# Patient Record
Sex: FEMALE | Race: WHITE | Employment: FULL TIME | URBAN - METROPOLITAN AREA
[De-identification: names, ages, dates, MRNs, and addresses within clinical notes are randomized per-mention and may not be internally consistent; named-entity substitution may affect disease eponyms.]

---

## 2019-11-22 ENCOUNTER — TRANSFERRED RECORDS (OUTPATIENT)
Dept: HEALTH INFORMATION MANAGEMENT | Facility: CLINIC | Age: 34
End: 2019-11-22

## 2020-10-26 ENCOUNTER — ALLIED HEALTH/NURSE VISIT (OUTPATIENT)
Dept: NURSING | Facility: CLINIC | Age: 35
End: 2020-10-26
Payer: COMMERCIAL

## 2020-10-26 DIAGNOSIS — Z23 NEED FOR PROPHYLACTIC VACCINATION AND INOCULATION AGAINST INFLUENZA: Primary | ICD-10-CM

## 2020-10-26 PROCEDURE — 90686 IIV4 VACC NO PRSV 0.5 ML IM: CPT

## 2020-10-26 PROCEDURE — 90471 IMMUNIZATION ADMIN: CPT

## 2021-07-07 ENCOUNTER — OFFICE VISIT (OUTPATIENT)
Dept: MIDWIFE SERVICES | Facility: CLINIC | Age: 36
End: 2021-07-07
Payer: COMMERCIAL

## 2021-07-07 VITALS
BODY MASS INDEX: 25.82 KG/M2 | SYSTOLIC BLOOD PRESSURE: 131 MMHG | DIASTOLIC BLOOD PRESSURE: 82 MMHG | OXYGEN SATURATION: 100 % | HEART RATE: 100 BPM | WEIGHT: 170.4 LBS | HEIGHT: 68 IN

## 2021-07-07 DIAGNOSIS — Z12.4 ROUTINE CERVICAL SMEAR: ICD-10-CM

## 2021-07-07 DIAGNOSIS — Z01.419 ENCOUNTER FOR GYNECOLOGICAL EXAMINATION WITHOUT ABNORMAL FINDING: Primary | ICD-10-CM

## 2021-07-07 DIAGNOSIS — Z13.228 SCREENING FOR METABOLIC DISORDER: ICD-10-CM

## 2021-07-07 DIAGNOSIS — Z13.21 SCREENING FOR MALNUTRITION: ICD-10-CM

## 2021-07-07 DIAGNOSIS — Z13.29 SCREENING FOR THYROID DISORDER: ICD-10-CM

## 2021-07-07 DIAGNOSIS — Z13.0 SCREENING FOR IRON DEFICIENCY ANEMIA: ICD-10-CM

## 2021-07-07 DIAGNOSIS — Z13.220 SCREENING FOR LIPOID DISORDERS: ICD-10-CM

## 2021-07-07 DIAGNOSIS — Z13.1 SCREENING FOR DIABETES MELLITUS: ICD-10-CM

## 2021-07-07 LAB
ERYTHROCYTE [DISTWIDTH] IN BLOOD BY AUTOMATED COUNT: 12.5 % (ref 10–15)
HBA1C MFR BLD: 5 % (ref 0–5.6)
HCT VFR BLD AUTO: 38.9 % (ref 35–47)
HGB BLD-MCNC: 13.2 G/DL (ref 11.7–15.7)
MCH RBC QN AUTO: 29.5 PG (ref 26.5–33)
MCHC RBC AUTO-ENTMCNC: 33.9 G/DL (ref 31.5–36.5)
MCV RBC AUTO: 87 FL (ref 78–100)
PLATELET # BLD AUTO: 205 10E9/L (ref 150–450)
RBC # BLD AUTO: 4.47 10E12/L (ref 3.8–5.2)
WBC # BLD AUTO: 5.4 10E9/L (ref 4–11)

## 2021-07-07 PROCEDURE — G0145 SCR C/V CYTO,THINLAYER,RESCR: HCPCS | Performed by: ADVANCED PRACTICE MIDWIFE

## 2021-07-07 PROCEDURE — 83036 HEMOGLOBIN GLYCOSYLATED A1C: CPT | Performed by: ADVANCED PRACTICE MIDWIFE

## 2021-07-07 PROCEDURE — 85027 COMPLETE CBC AUTOMATED: CPT | Performed by: ADVANCED PRACTICE MIDWIFE

## 2021-07-07 PROCEDURE — 99385 PREV VISIT NEW AGE 18-39: CPT | Performed by: ADVANCED PRACTICE MIDWIFE

## 2021-07-07 PROCEDURE — 87624 HPV HI-RISK TYP POOLED RSLT: CPT | Performed by: ADVANCED PRACTICE MIDWIFE

## 2021-07-07 PROCEDURE — 84443 ASSAY THYROID STIM HORMONE: CPT | Performed by: ADVANCED PRACTICE MIDWIFE

## 2021-07-07 PROCEDURE — 36415 COLL VENOUS BLD VENIPUNCTURE: CPT | Performed by: ADVANCED PRACTICE MIDWIFE

## 2021-07-07 PROCEDURE — 80053 COMPREHEN METABOLIC PANEL: CPT | Performed by: ADVANCED PRACTICE MIDWIFE

## 2021-07-07 PROCEDURE — 82306 VITAMIN D 25 HYDROXY: CPT | Performed by: ADVANCED PRACTICE MIDWIFE

## 2021-07-07 PROCEDURE — 80061 LIPID PANEL: CPT | Performed by: ADVANCED PRACTICE MIDWIFE

## 2021-07-07 SDOH — HEALTH STABILITY: MENTAL HEALTH: HOW OFTEN DO YOU HAVE A DRINK CONTAINING ALCOHOL?: MONTHLY OR LESS

## 2021-07-07 SDOH — HEALTH STABILITY: MENTAL HEALTH: HOW MANY STANDARD DRINKS CONTAINING ALCOHOL DO YOU HAVE ON A TYPICAL DAY?: 1 OR 2

## 2021-07-07 SDOH — HEALTH STABILITY: MENTAL HEALTH: HOW OFTEN DO YOU HAVE 6 OR MORE DRINKS ON ONE OCCASION?: NEVER

## 2021-07-07 ASSESSMENT — MIFFLIN-ST. JEOR: SCORE: 1506.43

## 2021-07-07 NOTE — PROGRESS NOTES
Masha is a 36 year old  female who presents for annual exam. She is feeling well. No concerns today but would like to mention a few things that are going on. She reports some changes to her menses which include getting migraines prior to her menses as well as breast tenderness. This was a change after her son was born and after breastfeeding which she stopped in 2017. We discussed these are normal changes to her menses. She is not ready to start any medication treatment for her migraines. She is due for a pap smear, collected today. She is due for annual labs, done today. She uses [facility] for birth control. She is  for 9 years. She is a . They just moved her from [facility], she is looking for a job to start this .     Menses are regular q 28-30 days and normal lasting 2 days spoting 5 days.  Menses flow: normal.  LMP 2021. Using [facility] for contraception.  She is not currently considering pregnancy.  Besides routine health maintenance,  she would like to discuss prolapse .  GYNECOLOGIC HISTORY:  Menarche: 9   Masha is sexually active with 1 male partner(s) and is currently in monogamous relationship with  x9 years.    History sexually transmitted infections: No STD history  STI testing offered?  Declined  MAGGY exposure: No  History of abnormal Pap smear: NO - age 30-65 PAP every 5 years with negative HPV co-testing recommended  Family history of breast CA: No  Family history of uterine/ovarian CA: No    Family history of colon CA: No    HEALTH MAINTENANCE:  Cholesterol: (No results found for: CHOL History of abnormal lipids: No  Mammo: na . History of abnormal Mammo: Not applicable.  Regular Self Breast Exams: No  Calcium/Vitamin D intake: source:  dairy, daily vit Adequate? Yes  TSH: (No results found for: TSH )  Pap; (No results found for: PAP )    HISTORY:  OB History    Para Term  AB Living   1 1 1 0 0 1   SAB TAB Ectopic Multiple Live Births   0  0 0 0 1      # Outcome Date GA Lbr Jonnie/2nd Weight Sex Delivery Anes PTL Lv   1 Term 01/12/15 41w0d  3.629 kg (8 lb) M Vag-Spont   CARLINE      Name: Olvin     Past Medical History:   Diagnosis Date     Gluten-sensitive enteropathy      Granuloma annulare      Intractable chronic migraine without aura      Rosacea      Past Surgical History:   Procedure Laterality Date     CYST REMOVAL      fatty tissue cyst, benign, on neck, left side     fibrous papule of nose      removed by derm     Family History   Problem Relation Age of Onset     Depression Mother      Migraines Mother      Other - See Comments Father         lump on breast     Migraines Maternal Grandmother      Liver Disease Maternal Grandmother         autoimmune liver disease     Depression Maternal Grandfather      Other - See Comments Paternal Grandmother         lump on breast     Seizure Disorder Son         from age 3-6 he took medication     Diabetes Maternal Uncle      Social History     Socioeconomic History     Marital status:      Spouse name: Not on file     Number of children: Not on file     Years of education: Not on file     Highest education level: Not on file   Occupational History     Not on file   Social Needs     Financial resource strain: Not on file     Food insecurity     Worry: Not on file     Inability: Not on file     Transportation needs     Medical: Not on file     Non-medical: Not on file   Tobacco Use     Smoking status: Not on file   Substance and Sexual Activity     Alcohol use: Not on file     Drug use: Not on file     Sexual activity: Not on file   Lifestyle     Physical activity     Days per week: Not on file     Minutes per session: Not on file     Stress: Not on file   Relationships     Social connections     Talks on phone: Not on file     Gets together: Not on file     Attends Sabianist service: Not on file     Active member of club or organization: Not on file     Attends meetings of clubs or organizations: Not on  "file     Relationship status: Not on file     Intimate partner violence     Fear of current or ex partner: Not on file     Emotionally abused: Not on file     Physically abused: Not on file     Forced sexual activity: Not on file   Other Topics Concern     Not on file   Social History Narrative     Not on file       Current Outpatient Medications:      Ascorbic Acid (VITAMIN C PO), , Disp: , Rfl:      Multiple Vitamin (DAILY VITAMINS PO), , Disp: , Rfl:      Allergies   Allergen Reactions     Sulfa Drugs      rashes       Past medical, surgical, social and family history were reviewed and updated in EPIC.    ROS:   C:     NEGATIVE for fever, chills, change in weight  I:       NEGATIVE for worrisome rashes, moles or lesions  E:     NEGATIVE for vision changes or irritation  E/M: NEGATIVE for ear, mouth and throat problems  R:     NEGATIVE for significant cough or SOB  CV:   NEGATIVE for chest pain, palpitations or peripheral edema  GI:     NEGATIVE for nausea, abdominal pain, heartburn, or change in bowel habits  :   NEGATIVE for frequency, dysuria, hematuria, vaginal discharge, or irregular bleeding  M:     NEGATIVE for significant arthralgias or myalgia  N:      NEGATIVE for weakness, dizziness or paresthesias  E:      NEGATIVE for temperature intolerance, skin/hair changes  P:      NEGATIVE for changes in mood or affect.    EXAM:  /82   Pulse 100   Ht 1.727 m (5' 8\")   Wt 77.3 kg (170 lb 6.4 oz)   SpO2 100%   BMI 25.91 kg/m     BMI: Body mass index is 25.91 kg/m .  Constitutional: healthy, alert and no distress  Head: Normocephalic. No masses, lesions, tenderness or abnormalities  Neck: Neck supple. Trachea midline. No adenopathy. Thyroid symmetric, normal size.   Cardiovascular: RRR.   Respiratory: Negative.   Breast: symmetrical and non tender, no masses, nipples everted, no discharge, SBE taught  Gastrointestinal: Abdomen soft, non-tender, non-distended. No masses, organomegaly.  :  Vulva:  No " external lesions, normal female hair distribution, no inguinal adenopathy.    Urethra:  Midline, non-tender, well supported, no discharge  Vagina:  Moist, pink, no abnormal discharge, no lesions  Uterus:  Normal size, retroverted, non-tender, freely mobile  Ovaries:  No masses appreciated, non-tender, mobile  Rectal Exam: deferred  Musculoskeletal: extremities normal  Skin: no suspicious lesions or rashes  Psychiatric: Affect appropriate, cooperative,mentation appears normal.     COUNSELING:   Reviewed preventive health counseling, as reflected in patient instructions   reports that she has never smoked. She has never used smokeless tobacco.    Body mass index is 25.91 kg/m .    FRAX Risk Assessment    ASSESSMENT:  36 year old female with satisfactory annual exam  (Z01.419) Encounter for gynecological examination without abnormal finding  (primary encounter diagnosis)    (Z12.4) Routine cervical smear  Plan: HPV High Risk Types DNA Cervical, Pap imaged         thin layer screen with HPV - recommended age 30        - 65 years (select HPV order below)    (Z13.21) Screening for malnutrition  Plan: Vitamin D Deficiency    (Z13.29) Screening for thyroid disorder  Plan: TSH with free T4 reflex    (Z13.220) Screening for lipoid disorders  Plan: Lipid panel reflex to direct LDL Non-fasting    (Z13.1) Screening for diabetes mellitus  Plan: Hemoglobin A1c    (Z13.0) Screening for iron deficiency anemia  Plan: CBC with platelets    (Z13.228) Screening for metabolic disorder  Plan: Comprehensive metabolic panel    Follow up for annual exams or PRN.   AAMIR Hargrove CNM

## 2021-07-08 LAB
ALBUMIN SERPL-MCNC: 4.3 G/DL (ref 3.4–5)
ALP SERPL-CCNC: 56 U/L (ref 40–150)
ALT SERPL W P-5'-P-CCNC: 29 U/L (ref 0–50)
ANION GAP SERPL CALCULATED.3IONS-SCNC: 7 MMOL/L (ref 3–14)
AST SERPL W P-5'-P-CCNC: 16 U/L (ref 0–45)
BILIRUB SERPL-MCNC: 0.4 MG/DL (ref 0.2–1.3)
BUN SERPL-MCNC: 12 MG/DL (ref 7–30)
CALCIUM SERPL-MCNC: 8.9 MG/DL (ref 8.5–10.1)
CHLORIDE SERPL-SCNC: 106 MMOL/L (ref 94–109)
CHOLEST SERPL-MCNC: 200 MG/DL
CO2 SERPL-SCNC: 27 MMOL/L (ref 20–32)
CREAT SERPL-MCNC: 0.77 MG/DL (ref 0.52–1.04)
DEPRECATED CALCIDIOL+CALCIFEROL SERPL-MC: 37 UG/L (ref 20–75)
GFR SERPL CREATININE-BSD FRML MDRD: >90 ML/MIN/{1.73_M2}
GLUCOSE SERPL-MCNC: 87 MG/DL (ref 70–99)
HDLC SERPL-MCNC: 69 MG/DL
LDLC SERPL CALC-MCNC: 116 MG/DL
NONHDLC SERPL-MCNC: 131 MG/DL
POTASSIUM SERPL-SCNC: 3.5 MMOL/L (ref 3.4–5.3)
PROT SERPL-MCNC: 6.9 G/DL (ref 6.8–8.8)
SODIUM SERPL-SCNC: 140 MMOL/L (ref 133–144)
TRIGL SERPL-MCNC: 76 MG/DL
TSH SERPL DL<=0.005 MIU/L-ACNC: 1.54 MU/L (ref 0.4–4)

## 2021-07-10 LAB
COPATH REPORT: NORMAL
PAP: NORMAL

## 2021-07-13 LAB
FINAL DIAGNOSIS: NORMAL
HPV HR 12 DNA CVX QL NAA+PROBE: NEGATIVE
HPV16 DNA SPEC QL NAA+PROBE: NEGATIVE
HPV18 DNA SPEC QL NAA+PROBE: NEGATIVE
SPECIMEN DESCRIPTION: NORMAL
SPECIMEN SOURCE CVX/VAG CYTO: NORMAL

## 2021-10-11 ENCOUNTER — HEALTH MAINTENANCE LETTER (OUTPATIENT)
Age: 36
End: 2021-10-11

## 2021-12-03 ENCOUNTER — MYC MEDICAL ADVICE (OUTPATIENT)
Dept: MIDWIFE SERVICES | Facility: CLINIC | Age: 36
End: 2021-12-03
Payer: COMMERCIAL

## 2021-12-03 DIAGNOSIS — M54.9 ACUTE MIDLINE BACK PAIN, UNSPECIFIED BACK LOCATION: Primary | ICD-10-CM

## 2021-12-03 NOTE — TELEPHONE ENCOUNTER
Patient notified via [facility] that PT referral placed. Given instructions and phone number to schedule.   Sylvia Bowers RN

## 2021-12-03 NOTE — TELEPHONE ENCOUNTER
Patient was seen for an AE back in July. She says she recently pulled a muscle in her back and it has been hurting her for a while. She is requesting a referral for PT. Are you okay with a referral or would you recommend the patient follow-up with FP? Ramya Anguiano RN

## 2021-12-03 NOTE — CONFIDENTIAL NOTE
I am fine placing the order. I signed the pending one. I added patient may be interested in pelvic floor and coordinating her appointments if available. Thanks, AAMIR Hargrove CNM

## 2021-12-31 NOTE — PROGRESS NOTES
[facility] Initial Evaluation    Subjective:  Masha Spence is a 36 year old female with a lumbar condition. Mechanism of injury: Unknown cause. Where: (home, work, MVA, community, recreation/sport, unknown, other): NA. Onset of symptoms: 11/10/21, PT order date: 12/3/21. Location of symptoms: bilateral low back, left lateral hip. Pain level on number scale: 3-10/10. Quality of pain: burning. Associated symptoms: stiffness, denies numbness and tingling. Pain frequency (constant/intermittent): intermittent. Symptoms are exacerbated by: sit<>stand, floor<>standing transfer, lifting, sitting, moving in bed, in<>out of bed. Symptoms are relieved by: stretching. Progression of symptoms since onset (same/better/worse): better. Special tests (x-ray, MRI, CT scan, EMG, bone scan): None. Previous treatment: massage. Improvement with previous treatment: moderate improvement. General health as reported by patient is good. Pertinent medical history includes:  see Epic. Medical allergies includes: see Epic. Surgical history includes: see Epic. Current medications include: see Epic. Occupation: . Patient is (working in normal job without restrictions/working in normal job with restrictions/working in an alternate job/not working due to present treatment problem): working in normal job. Primary job tasks: computer work, prolonged sitting, prolonged standing. Barriers at home/work: None reported by patient. Red flags: None reported by patient.    Answers for HPI/ROS submitted by the patient on 1/2/2022  Reason for Visit:: Back pain  When problem began:: 11/10/2021  How problem occurred:: First I tried doing yoga for the psoas, and then later that week I drove the car without remembering to adjust it after my , and then I did a lot of walking -- and then I woke up very stiff and in a lot of pain.  Number scale: 3/10  General health as reported by patient: good  Medical  allergies: none  Surgeries: none  Medications you are currently taking: none  Occupation::   What are your primary job tasks: computer work, prolonged sitting, prolonged standing    Objective  Posture    Sitting (good/fair/poor): poor  Standing (good/fair/poor):  fair  Lordosis (red/acc/normal):  Normal  Lateral shift (right/left/nil):  nil  Relevant lateral shift (yes/no):  No   Correction of posture (better/worse/no effect): better    Lumbar Movement Loss Lisandro Mod Min Nil Pain   Flexion    x    Extension  x   +   Side Gliding L   x  +   Side Gliding R    x +     Assessment/Plan:    Patient is a 36 year old female with lumbar complaints.    Patient has the following significant findings with corresponding treatment plan.                Diagnosis 1:  LBP  Pain -  manual therapy, self management, education, directional preference exercise and home program  Decreased ROM/flexibility - manual therapy and therapeutic exercise  Decreased joint mobility - manual therapy and therapeutic exercise  Decreased strength - therapeutic exercise and therapeutic activities  Impaired muscle performance - neuro re-education  Decreased function - therapeutic activities  Impaired posture - neuro re-education    Previous and current functional limitations:  (See Goal Flow Sheet for this information)    Short term and Long term goals: (See Goal Flow Sheet for this information)     Communication ability:  Patient appears to be able to clearly communicate and understand verbal and written communication and follow directions correctly.  Treatment Explanation - The following has been discussed with the patient:   RX ordered/plan of care  Anticipated outcomes  Possible risks and side effects  This patient would benefit from PT intervention to resume normal activities.   Rehab potential is good.    Frequency:  1 X week, once daily  Duration:  for 8 weeks  Discharge Plan:  Achieve all LTG.  Independent in home treatment  program.  Reach maximal therapeutic benefit.    Please refer to the daily flowsheet for treatment today, total treatment time and time spent performing 1:1 timed codes.

## 2022-01-03 ENCOUNTER — THERAPY VISIT (OUTPATIENT)
Dept: PHYSICAL THERAPY | Facility: CLINIC | Age: 37
End: 2022-01-03
Attending: ADVANCED PRACTICE MIDWIFE
Payer: COMMERCIAL

## 2022-01-03 DIAGNOSIS — M54.50 ACUTE BILATERAL LOW BACK PAIN WITHOUT SCIATICA: ICD-10-CM

## 2022-01-03 DIAGNOSIS — M54.9 ACUTE MIDLINE BACK PAIN, UNSPECIFIED BACK LOCATION: ICD-10-CM

## 2022-01-03 PROCEDURE — 97110 THERAPEUTIC EXERCISES: CPT | Mod: GP | Performed by: PHYSICAL THERAPIST

## 2022-01-03 PROCEDURE — 97161 PT EVAL LOW COMPLEX 20 MIN: CPT | Mod: GP | Performed by: PHYSICAL THERAPIST

## 2022-01-03 PROCEDURE — 97530 THERAPEUTIC ACTIVITIES: CPT | Mod: GP | Performed by: PHYSICAL THERAPIST

## 2022-01-10 ENCOUNTER — THERAPY VISIT (OUTPATIENT)
Dept: PHYSICAL THERAPY | Facility: CLINIC | Age: 37
End: 2022-01-10
Payer: COMMERCIAL

## 2022-01-10 DIAGNOSIS — M54.50 ACUTE BILATERAL LOW BACK PAIN WITHOUT SCIATICA: ICD-10-CM

## 2022-01-10 PROCEDURE — 97530 THERAPEUTIC ACTIVITIES: CPT | Mod: GP | Performed by: PHYSICAL THERAPIST

## 2022-01-10 PROCEDURE — 97110 THERAPEUTIC EXERCISES: CPT | Mod: GP | Performed by: PHYSICAL THERAPIST

## 2022-01-17 ENCOUNTER — THERAPY VISIT (OUTPATIENT)
Dept: PHYSICAL THERAPY | Facility: CLINIC | Age: 37
End: 2022-01-17
Payer: COMMERCIAL

## 2022-01-17 DIAGNOSIS — M54.50 ACUTE BILATERAL LOW BACK PAIN WITHOUT SCIATICA: ICD-10-CM

## 2022-01-17 PROCEDURE — 97110 THERAPEUTIC EXERCISES: CPT | Mod: GP | Performed by: PHYSICAL THERAPIST

## 2022-01-17 PROCEDURE — 97112 NEUROMUSCULAR REEDUCATION: CPT | Mod: GP | Performed by: PHYSICAL THERAPIST

## 2022-02-07 ENCOUNTER — THERAPY VISIT (OUTPATIENT)
Dept: PHYSICAL THERAPY | Facility: CLINIC | Age: 37
End: 2022-02-07
Payer: COMMERCIAL

## 2022-02-07 DIAGNOSIS — M54.50 ACUTE BILATERAL LOW BACK PAIN WITHOUT SCIATICA: ICD-10-CM

## 2022-02-07 PROCEDURE — 97112 NEUROMUSCULAR REEDUCATION: CPT | Mod: GP | Performed by: PHYSICAL THERAPIST

## 2022-02-07 PROCEDURE — 97110 THERAPEUTIC EXERCISES: CPT | Mod: GP | Performed by: PHYSICAL THERAPIST

## 2022-02-16 ENCOUNTER — THERAPY VISIT (OUTPATIENT)
Dept: PHYSICAL THERAPY | Facility: CLINIC | Age: 37
End: 2022-02-16
Payer: COMMERCIAL

## 2022-02-16 DIAGNOSIS — M54.50 ACUTE BILATERAL LOW BACK PAIN WITHOUT SCIATICA: ICD-10-CM

## 2022-02-16 PROCEDURE — 97110 THERAPEUTIC EXERCISES: CPT | Mod: GP | Performed by: PHYSICAL THERAPIST

## 2022-02-16 PROCEDURE — 97112 NEUROMUSCULAR REEDUCATION: CPT | Mod: GP | Performed by: PHYSICAL THERAPIST

## 2022-04-18 NOTE — PROGRESS NOTES
Discharge Note    Progress reporting period is from initial evaluation date (please see noted date below) to Feb 16, 2022.  Linked Episodes   Type: Episode: Status: Noted: Resolved: Last update: Updated by:   PHYSICAL THERAPY lumbar spine Active 1/3/2022  2/16/2022  4:05 PM Christian Rowland PT      Comments:       Masha failed to follow up and current status is unknown.  Please see information below for last relevant information on current status.  Patient seen for 5 visits.    SUBJECTIVE  Subjective changes noted by patient:  Patient reports pain symptoms are lot better. Decreased pain with prolonged sitting. Improving frequency of pressups throughout the day. Core strengthening exercises are going well.   .  Current pain level is 0/10.     Previous pain level was  10/10.   Changes in function:  Yes (See Goal flowsheet attached for changes in current functional level)  Adverse reaction to treatment or activity: None    OBJECTIVE  Changes noted in objective findings: lumbar AROM FL = WNL and no pain, EXT = min loss and no pain; front plank = 20 seconds     ASSESSMENT/PLAN  Diagnosis: LBP   Updated problem list and treatment plan:     STG/LTGs have been met or progress has been made towards goals:  Yes, please see goal flowsheet for most current information  Assessment of Progress: current status is unknown.    Last current status: Pt is progressing as expected   Self Management Plans:  HEP  I have re-evaluated this patient and find that the nature, scope, duration and intensity of the therapy is appropriate for the medical condition of the patient.  Masha continues to require the following intervention to meet STG and LTG's:  HEP.    Recommendations:  Discharge with current home program.  Patient to follow up with MD as needed.    Please refer to the daily flowsheet for treatment today, total treatment time and time spent performing 1:1 timed codes.

## 2022-06-10 ENCOUNTER — OFFICE VISIT (OUTPATIENT)
Dept: FAMILY MEDICINE | Facility: CLINIC | Age: 37
End: 2022-06-10
Payer: COMMERCIAL

## 2022-06-10 VITALS
WEIGHT: 174 LBS | TEMPERATURE: 97.5 F | SYSTOLIC BLOOD PRESSURE: 119 MMHG | RESPIRATION RATE: 16 BRPM | HEIGHT: 68 IN | OXYGEN SATURATION: 99 % | HEART RATE: 99 BPM | BODY MASS INDEX: 26.37 KG/M2 | DIASTOLIC BLOOD PRESSURE: 72 MMHG

## 2022-06-10 DIAGNOSIS — L98.9 SKIN LESION: ICD-10-CM

## 2022-06-10 DIAGNOSIS — Z00.00 ROUTINE HISTORY AND PHYSICAL EXAMINATION OF ADULT: Primary | ICD-10-CM

## 2022-06-10 DIAGNOSIS — Z11.4 SCREENING FOR HIV (HUMAN IMMUNODEFICIENCY VIRUS): ICD-10-CM

## 2022-06-10 DIAGNOSIS — M62.89 PELVIC FLOOR DYSFUNCTION IN FEMALE: ICD-10-CM

## 2022-06-10 DIAGNOSIS — Z83.49 FAMILY HISTORY OF THYROID DISEASE: ICD-10-CM

## 2022-06-10 DIAGNOSIS — Z13.220 SCREENING FOR LIPID DISORDERS: ICD-10-CM

## 2022-06-10 DIAGNOSIS — Z00.00 ROUTINE GENERAL MEDICAL EXAMINATION AT A HEALTH CARE FACILITY: ICD-10-CM

## 2022-06-10 DIAGNOSIS — Z13.21 ENCOUNTER FOR SCREENING FOR NUTRITIONAL DISORDER: ICD-10-CM

## 2022-06-10 DIAGNOSIS — Z11.59 NEED FOR HEPATITIS C SCREENING TEST: ICD-10-CM

## 2022-06-10 LAB
CHOLEST SERPL-MCNC: 175 MG/DL
DEPRECATED CALCIDIOL+CALCIFEROL SERPL-MC: 34 UG/L (ref 20–75)
FASTING STATUS PATIENT QL REPORTED: YES
HCV AB SERPL QL IA: NONREACTIVE
HDLC SERPL-MCNC: 70 MG/DL
HGB BLD-MCNC: 14.6 G/DL (ref 11.7–15.7)
HIV 1+2 AB+HIV1 P24 AG SERPL QL IA: NONREACTIVE
LDLC SERPL CALC-MCNC: 80 MG/DL
NONHDLC SERPL-MCNC: 105 MG/DL
TRIGL SERPL-MCNC: 123 MG/DL
TSH SERPL DL<=0.005 MIU/L-ACNC: 1.51 MU/L (ref 0.4–4)

## 2022-06-10 PROCEDURE — 82306 VITAMIN D 25 HYDROXY: CPT | Performed by: STUDENT IN AN ORGANIZED HEALTH CARE EDUCATION/TRAINING PROGRAM

## 2022-06-10 PROCEDURE — 87389 HIV-1 AG W/HIV-1&-2 AB AG IA: CPT | Performed by: STUDENT IN AN ORGANIZED HEALTH CARE EDUCATION/TRAINING PROGRAM

## 2022-06-10 PROCEDURE — 84443 ASSAY THYROID STIM HORMONE: CPT | Performed by: STUDENT IN AN ORGANIZED HEALTH CARE EDUCATION/TRAINING PROGRAM

## 2022-06-10 PROCEDURE — 99385 PREV VISIT NEW AGE 18-39: CPT | Performed by: STUDENT IN AN ORGANIZED HEALTH CARE EDUCATION/TRAINING PROGRAM

## 2022-06-10 PROCEDURE — 80061 LIPID PANEL: CPT | Performed by: STUDENT IN AN ORGANIZED HEALTH CARE EDUCATION/TRAINING PROGRAM

## 2022-06-10 PROCEDURE — 36415 COLL VENOUS BLD VENIPUNCTURE: CPT | Performed by: STUDENT IN AN ORGANIZED HEALTH CARE EDUCATION/TRAINING PROGRAM

## 2022-06-10 PROCEDURE — 85018 HEMOGLOBIN: CPT | Performed by: STUDENT IN AN ORGANIZED HEALTH CARE EDUCATION/TRAINING PROGRAM

## 2022-06-10 PROCEDURE — 86803 HEPATITIS C AB TEST: CPT | Performed by: STUDENT IN AN ORGANIZED HEALTH CARE EDUCATION/TRAINING PROGRAM

## 2022-06-10 ASSESSMENT — ENCOUNTER SYMPTOMS
FREQUENCY: 0
CONSTIPATION: 0
WEAKNESS: 0
ARTHRALGIAS: 0
JOINT SWELLING: 0
MYALGIAS: 0
BREAST MASS: 0
HEADACHES: 0
PALPITATIONS: 0
DYSURIA: 0
DIZZINESS: 0
HEARTBURN: 0
NERVOUS/ANXIOUS: 0
NAUSEA: 0
CHILLS: 0
EYE PAIN: 0
COUGH: 0
PARESTHESIAS: 0
ABDOMINAL PAIN: 0
SORE THROAT: 0
SHORTNESS OF BREATH: 0
DIARRHEA: 0
FEVER: 0
HEMATURIA: 0
HEMATOCHEZIA: 0

## 2022-06-10 NOTE — PROGRESS NOTES
SUBJECTIVE:   CC: Masha Spence is an 37 year old woman who presents for preventive health visit.     Patient has been advised of split billing requirements and indicates understanding: Yes  Healthy Habits:     Getting at least 3 servings of Calcium per day:  NO    Bi-annual eye exam:  Yes    Dental care twice a year:  Yes    Sleep apnea or symptoms of sleep apnea:  None and Daytime drowsiness    Diet:  Gluten-free/reduced    Frequency of exercise:  2-3 days/week    Duration of exercise:  30-45 minutes    Taking medications regularly:  Yes    PHQ-2 Total Score: 0    Additional concerns today:  No    PROBLEMS TO ADD ON...    Requests physical therapy for concern for vaginal prolapse. Notes pelvic floor straining when using the bathroom. Occasional constipation. Problematic since the birth of her son.     Jogs for exercise. Walks a lot.     Works as an  for elementary school. She has a 8 yo son.       Today's PHQ-2 Score:   PHQ-2 ( 1999 [employer]) 6/10/2022   Q1: Little interest or pleasure in doing things 0   Q2: Feeling down, depressed or hopeless 0   PHQ-2 Score 0   Q1: Little interest or pleasure in doing things Not at all   Q2: Feeling down, depressed or hopeless Not at all   PHQ-2 Score 0       Abuse: Current or Past (Physical, Sexual or Emotional) - No  Do you feel safe in your environment? Yes    Have you ever done Advance Care Planning? (For example, a Health Directive, POLST, or a discussion with a medical provider or your loved ones about your wishes): No, advance care planning information given to patient to review.  Patient plans to discuss their wishes with loved ones or provider.      Social History     Tobacco Use     Smoking status: Never Smoker     Smokeless tobacco: Never Used   Substance Use Topics     Alcohol use: Yes     If you drink alcohol do you typically have >3 drinks per day or >7 drinks per week? No    Alcohol Use 6/10/2022   Prescreen: >3 drinks/day or >7 drinks/week? No    Prescreen: >3 drinks/day or >7 drinks/week? -   No flowsheet data found.    Reviewed orders with patient.  Reviewed health maintenance and updated orders accordingly - Yes  Lab work is in process    Breast Cancer Screening:    FHS-7:   Breast CA Risk Assessment (FHS-7) 6/10/2022   Did any of your first-degree relatives have breast or ovarian cancer? Unknown   Did any of your relatives have bilateral breast cancer? No   Did any man in your family have breast cancer? Yes   Did any woman in your family have breast and ovarian cancer? Unknown   Did any woman in your family have breast cancer before age 50 y? Unknown   Do you have 2 or more relatives with breast and/or ovarian cancer? Unknown   Do you have 2 or more relatives with breast and/or bowel cancer? No   Father had a breast lump removed and his mother had a breast lump removed (unknown if cancer for either).     Pertinent mammograms are reviewed under the imaging tab.    History of abnormal Pap smear: NO - age 30-65 PAP every 5 years with negative HPV co-testing recommended  PAP / HPV Latest Ref Rng & Units 7/7/2021   PAP (Historical) - NIL   HPV16 NEG:Negative Negative   HPV18 NEG:Negative Negative   HRHPV NEG:Negative Negative     Reviewed and updated as needed this visit by clinical staff   Tobacco  Allergies  Meds   Med Hx  Surg Hx  Fam Hx  Soc Hx          Reviewed and updated as needed this visit by Provider                     Review of Systems   Constitutional: Negative for chills and fever.   HENT: Negative for congestion, ear pain, hearing loss and sore throat.    Eyes: Negative for pain and visual disturbance.   Respiratory: Negative for cough and shortness of breath.    Cardiovascular: Negative for chest pain, palpitations and peripheral edema.   Gastrointestinal: Negative for abdominal pain, constipation, diarrhea, heartburn, hematochezia and nausea.   Breasts:  Negative for tenderness, breast mass and discharge.   Genitourinary: Negative for  "dysuria, frequency, genital sores, hematuria, pelvic pain, urgency, vaginal bleeding and vaginal discharge.   Musculoskeletal: Negative for arthralgias, joint swelling and myalgias.   Skin: Negative for rash.   Neurological: Negative for dizziness, weakness, headaches and paresthesias.   Psychiatric/Behavioral: Negative for mood changes. The patient is not nervous/anxious.         OBJECTIVE:   /72 (BP Location: Right arm, Patient Position: Chair, Cuff Size: Adult Regular)   Pulse 99   Temp 97.5  F (36.4  C) (Temporal)   Resp 16   Ht 1.734 m (5' 8.25\")   Wt 78.9 kg (174 lb)   LMP 05/29/2022 (Exact Date)   SpO2 99%   BMI 26.26 kg/m    Physical Exam  GENERAL: healthy, alert and no distress  EYES: Eyes grossly normal to inspection, PERRL and conjunctivae and sclerae normal  HENT: ear canals and TM's normal, nose and mouth without ulcers or lesions  NECK: no adenopathy, no asymmetry, masses, or scars and thyroid normal to palpation  RESP: lungs clear to auscultation - no rales, rhonchi or wheezes  BREAST: normal without masses, tenderness or nipple discharge and no palpable axillary masses or adenopathy  CV: regular rate and rhythm, normal S1 S2, no S3 or S4, no murmur, click or rub, no peripheral edema and peripheral pulses strong  ABDOMEN: soft, nontender, no hepatosplenomegaly, no masses and bowel sounds normal  MS: no gross musculoskeletal defects noted, no edema  SKIN: no suspicious lesions or rashes  NEURO: Normal strength and tone, mentation intact and speech normal  PSYCH: mentation appears normal, affect normal/bright    Diagnostic Test Results:  Labs reviewed in Epic    ASSESSMENT/PLAN:   Masha was seen today for physical and establish care.    Diagnoses and all orders for this visit:    Routine history and physical examination of adult  -     Hemoglobin; Future  -     Hemoglobin    Screening for HIV (human immunodeficiency virus)  -     HIV Antigen Antibody Combo; Future  -     HIV Antigen " "Antibody Combo    Need for hepatitis C screening test  -     Hepatitis C Screen Reflex to HCV RNA Quant and Genotype; Future  -     Hepatitis C Screen Reflex to HCV RNA Quant and Genotype    Pelvic floor dysfunction in female  -     Physical Therapy Referral; Future    Skin lesion  Dermatology referral requested for skin check.  -     Adult Dermatology Referral; Future    Encounter for screening for nutritional disorder  -     Vitamin D Deficiency; Future  -     Vitamin D Deficiency    Screening for lipid disorders  -     Lipid panel reflex to direct LDL Non-fasting; Future  -     Lipid panel reflex to direct LDL Non-fasting    Family history of thyroid disease  -     TSH with free T4 reflex; Future  -     TSH with free T4 reflex    Other orders  -     REVIEW OF HEALTH MAINTENANCE PROTOCOL ORDERS      COUNSELING:  Reviewed preventive health counseling, as reflected in patient instructions    Estimated body mass index is 26.26 kg/m  as calculated from the following:    Height as of this encounter: 1.734 m (5' 8.25\").    Weight as of this encounter: 78.9 kg (174 lb).        She reports that she has never smoked. She has never used smokeless tobacco.      Counseling Resources:  ATP IV Guidelines  Pooled Cohorts Equation Calculator  Breast Cancer Risk Calculator  BRCA-Related Cancer Risk Assessment: FHS-7 Tool  FRAX Risk Assessment  ICSI Preventive Guidelines  Dietary Guidelines for Americans, 2010  USDA's MyPlate  ASA Prophylaxis  Lung CA Screening    Ciarra Vasquez MD  [facility]  "

## 2022-06-10 NOTE — PATIENT INSTRUCTIONS
For constipation, magnesium citrate can be helpful 400 mg per day.     Preventive Health Recommendations  Female Ages 26 - 39  Yearly exam:   See your health care provider every year in order to    Review health changes.     Discuss preventive care.      Review your medicines if you your doctor has prescribed any.    Until age 30: Get a Pap test every three years (more often if you have had an abnormal result).    After age 30: Talk to your doctor about whether you should have a Pap test every 3 years or have a Pap test with HPV screening every 5 years.   You do not need a Pap test if your uterus was removed (hysterectomy) and you have not had cancer.  You should be tested each year for STDs (sexually transmitted diseases), if you're at risk.   Talk to your provider about how often to have your cholesterol checked.  If you are at risk for diabetes, you should have a diabetes test (fasting glucose).  Shots: Get a flu shot each year. Get a tetanus shot every 10 years.   Nutrition:     Eat at least 5 servings of fruits and vegetables each day.    Eat whole-grain bread, whole-wheat pasta and brown rice instead of white grains and rice.    Get adequate Calcium and Vitamin D.     Lifestyle    Exercise at least 150 minutes a week (30 minutes a day, 5 days of the week). This will help you control your weight and prevent disease.    Limit alcohol to one drink per day.    No smoking.     Wear sunscreen to prevent skin cancer.    See your dentist every six months for an exam and cleaning.

## 2022-06-29 ENCOUNTER — TELEPHONE (OUTPATIENT)
Dept: FAMILY MEDICINE | Facility: CLINIC | Age: 37
End: 2022-06-29

## 2022-06-29 ENCOUNTER — LAB (OUTPATIENT)
Dept: LAB | Facility: CLINIC | Age: 37
End: 2022-06-29
Attending: FAMILY MEDICINE
Payer: COMMERCIAL

## 2022-06-29 DIAGNOSIS — Z20.822 ENCOUNTER FOR LABORATORY TESTING FOR COVID-19 VIRUS: ICD-10-CM

## 2022-06-29 PROCEDURE — U0005 INFEC AGEN DETEC AMPLI PROBE: HCPCS

## 2022-06-29 PROCEDURE — U0003 INFECTIOUS AGENT DETECTION BY NUCLEIC ACID (DNA OR RNA); SEVERE ACUTE RESPIRATORY SYNDROME CORONAVIRUS 2 (SARS-COV-2) (CORONAVIRUS DISEASE [COVID-19]), AMPLIFIED PROBE TECHNIQUE, MAKING USE OF HIGH THROUGHPUT TECHNOLOGIES AS DESCRIBED BY CMS-2020-01-R: HCPCS

## 2022-06-29 NOTE — TELEPHONE ENCOUNTER
Reason for Call:  Form, our goal is to have forms completed with 72 hours, however, some forms may require a visit or additional information.    Type of letter, form or note:  Certificate of Testig for covid 19 form from [facility].  Patient and son came in for a lab only appointment today to get tested for covid.  They need the form signed by a provider once the results are back.  Patient is wanting to  the forms on Friday the latest.  Who is able to sign and stamp the forms?    Who is the form from?: [facility], [employer], [facility] (if other please explain)    Where did the form come from: Patient or family brought in       What clinic location was the form placed at?: [place]    Where the form was placed: I will place on desk of provider that is able to sign and stamp the forms    What number is listed as a contact on the form?: NO phone number listed       Additional comments: Also have form for son.    Call taken on 6/29/2022 at 4:41 PM by Shruti Chen

## 2022-06-30 LAB — SARS-COV-2 RNA RESP QL NAA+PROBE: NEGATIVE

## 2022-06-30 NOTE — TELEPHONE ENCOUNTER
If patient's do not have PCP here, should we be giving forms back and asking them to forward to their provider? Thanks!

## 2022-06-30 NOTE — TELEPHONE ENCOUNTER
Masha was insistent that a provider at [place] review the forms for signature. Author will discuss with providers when they are back in medical clinic.

## 2022-08-09 ENCOUNTER — THERAPY VISIT (OUTPATIENT)
Dept: PHYSICAL THERAPY | Facility: CLINIC | Age: 37
End: 2022-08-09
Attending: STUDENT IN AN ORGANIZED HEALTH CARE EDUCATION/TRAINING PROGRAM
Payer: COMMERCIAL

## 2022-08-09 DIAGNOSIS — M62.89 PELVIC FLOOR DYSFUNCTION IN FEMALE: ICD-10-CM

## 2022-08-09 PROCEDURE — 97530 THERAPEUTIC ACTIVITIES: CPT | Mod: GP | Performed by: PHYSICAL THERAPIST

## 2022-08-09 PROCEDURE — 97140 MANUAL THERAPY 1/> REGIONS: CPT | Mod: GP | Performed by: PHYSICAL THERAPIST

## 2022-08-09 PROCEDURE — 97110 THERAPEUTIC EXERCISES: CPT | Mod: GP | Performed by: PHYSICAL THERAPIST

## 2022-08-09 PROCEDURE — 97161 PT EVAL LOW COMPLEX 20 MIN: CPT | Mod: GP | Performed by: PHYSICAL THERAPIST

## 2022-08-09 NOTE — PROGRESS NOTES
"Physical Therapy Initial Evaluation  Subjective:  The history is provided by the patient. No  was used.   Patient Health History  Masha Spence being seen for suspected prolapse.     Date of Onset: atleast the past year.   Problem occurred: possible started from exercise, or delayed toilet time    Pain is reported as 0/10 on pain scale.  General health as reported by patient is good.        Other medical allergies details: sulfa drugs .    Other surgery history details: benign tumor removal .     Other medications details: daily vitamin .    Current occupation is Teacher .   Primary job tasks include:  Computer work, driving, prolonged sitting and prolonged standing.                                  History of current episode:    Onset date/MD order: Pelvic floor dysfunction 6/10/2022.    CC/Present symptoms: Patient reports that she has a feeling of something falling out of the vagina, just when she is on the toilet.  Started to feel this atleast a year ago.  She feels it most when she is having bowel movement, feels like she can't get all of the stool out.  She does sometimes splint and that does seem to help evacuate the rest of the stool.  Feels like bowel movements are consistent otherwise.  Urination seems to be fine, had pelvic floor physical therapy about 5 years ago for urinary hesitancy and it seems to be doing better since that.  She thinks she grew up pushing pee out and has tried to not do that since her last episode of PT.   Denies pressure or heaviness any other time.  Had some low back pain starting last fall with new job and longer commute, improved with PT and seems to be doing better.  Did do some \"mama strong\" exercise classes and is wondering if that made it worse, did have some leakage once in that class.  Also has used menstrual cup in the past, but stopped using it when she noticed this.    HPI/SMHx/Childbirth hx::.  6 years old.  Pregnancy was fine, vaginal " delivery with minor tearing no stitches.  Had some feeling of bladder infections for 2 years following birth, got better with PT.    Pain rating (0-10): 0/10.    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  Usually 1-2  Miles a couple times per week.    Goals: Find out if there is a way to avoid more prolapse   Red flags:    Urination:  Do you leak on the way to the bathroom or with a strong urge to void? no   Do you leak with cough,sneeze, jumping, running? no  Any other activities that cause leaking?  Only a handful of times from bodyweight exercises, not a big concern   Do you have triggers that make you feel you can't wait to go to the bathroom?  What are they? no.  Type of pad and number used per day? none  When you leak what is the amount? n/a  How long can you delay the need to urinate? A few hours.   Do you feel excessive pressure in pelvic floor:yes.  When? On toilet, sometimes with exercise  Frequency of daytime urination:every few hours  Frequency of nighttime urination:1  Can you stop the flow of urine when on the toilet? yes  Is the volume of urine passed usually:  (8sec rule= 250ml with average bladder storing 400-600ml) medium  Do you strain to pass urine? no  Do you have a slow or hesitant urinary stream? no  Do you have difficulty initiating the urine stream? no  Is urination painful? no  How many bladder infections have you had in last 12 months?0  Fluid intake(one glass is 8oz or one cup) 2-3glasses/day, -caffinated glasses/day  - alcohol glasses/day.  Bowel habits:  Frequency of bowel movements? 1 times a day  Consistancy of stool?  Martin Stool Scale type 4  Do you ignore the urge to defecate? no  Do you strain to pass stool? Sometimes, it will feel like I need to strain and then very little comes out    Pelvic Pain:  Do you have any pelvic pain with intercourse, exams, use of tampons? no  Is initial penetration during intercourse painful? no  Is deeper penetration painful? no  Do you use lubricant?  Yes, or salive  Are you sexually active? yes  Have you ever been worried for your physical safety? no  Have you practiced the PF(kegel) exercises for 4 or more weeks?no  Marinoff Scale:Level n/a  (Level 3: Abstinence from intercourse because of severe pain. Level 2: Painful intercourse which limites frequency of activity. Level 1: Painful intercourse not severe enough to prevent activity.)    OBJECTIVE:      Treatment/Education provided this session: please see flow sheet for details    ASSESSMENT/PLAN:    Patient is a 37 year old female with pelvic floor  complaints.    Patient has the following significant findings with corresponding treatment plan.                Diagnosis 1:  Pelvic floor dysfunction -  manual therapy, self management, education, directional preference exercise and home program  Decreased ROM/flexibility - manual therapy and therapeutic exercise  Decreased joint mobility - manual therapy and therapeutic exercise  Decreased strength - therapeutic exercise and therapeutic activities  Decreased proprioception - neuro re-education and therapeutic activities  Impaired gait - gait training  Impaired muscle performance - neuro re-education  Decreased function - therapeutic activities  Impaired posture - neuro re-education    Previous and current functional limitations:  (See Goal Flow Sheet for this information)    Short term and Long term goals: (See Goal Flow Sheet for this information)     Communication ability:  Patient appears to be able to clearly communicate and understand verbal and written communication and follow directions correctly.  Treatment Explanation - The following has been discussed with the patient:   RX ordered/plan of care  Anticipated outcomes  Possible risks and side effects  This  patient would benefit from PT intervention to resume normal activities.   Rehab potential is good.    Frequency:  1X week, once daily, every 3 weeks   Duration:  for 12 weeks  Discharge Plan:  Achieve all LTG.  Independent in home treatment program.  Reach maximal therapeutic benefit.    Please refer to the daily flowsheet for treatment today, total treatment time and time spent performing 1:1 timed codes.     Objective:  System         Lumbar/SI Evaluation  ROM:    AROM Lumbar:   Flexion:          Min nickerson   Ext:                    Min nickerson    Side Bend:        Left:     Right:   Rotation:           Left:     Right:   Side Glide:        Left:  Wnl    Right:  Wnl                                                    Pelvic Dysfunction Evaluation:        Flexibility:    Tightness present at:Hamstrings and Piriformis    Abdominal Wall:  Abdominal wall pelvic: abdominal distension with abdominal contraction.  Diastasis Recti:  Normal      Pelvic Clock Exam:  Pelvic clock exam: TTP to DTP, LA with internal palpation.  Ischiocavernosis pain:  -  Bulbocavernosis pain:  -  Transverse Perineal:  +  Levator ANI:  ++        External Assessment:  External assessment pelvic: Grade 2 cystocele, uterine prolapse, and rectocele, difficult to assess due to difficulty with bearing down.  Skin Condition:  Normal  Scars:  Well healed  Bearing Down/Coughing:  Uterine prolapse, rectocele and cystocele      Muscle Contraction/Perineal Mobility:  Slight lift, no urogential triangle descent  Internal Assessment:  Internal assessment pelvic: some pelvic descent with abdominal contraction indicating difficulty with pressure management.  Sensory Exam:  Normal  Contraction/Grade:  Weak squeeze, 2 second hold (2)                    Hip Evaluation  HIP AROM:  AROM:    Left Hip:     Normal    Right Hip:   Normal                          Functional Testing:          Quad:    Single leg squat:   Left:    Mild loss of control  Right:   Mild loss of  control    Bilateral leg squat:  Normal control     Proprioception:    Stork balance test:   Left:    15  Right:  15  % of Uninvolved:                General     ROS

## 2022-09-22 ENCOUNTER — PRENATAL OFFICE VISIT (OUTPATIENT)
Dept: NURSING | Facility: CLINIC | Age: 37
End: 2022-09-22
Payer: COMMERCIAL

## 2022-09-22 ENCOUNTER — TRANSCRIBE ORDERS (OUTPATIENT)
Dept: MATERNAL FETAL MEDICINE | Facility: CLINIC | Age: 37
End: 2022-09-22

## 2022-09-22 VITALS — HEIGHT: 68 IN | BODY MASS INDEX: 26.52 KG/M2 | WEIGHT: 175 LBS

## 2022-09-22 DIAGNOSIS — O26.90 PREGNANCY RELATED CONDITION, ANTEPARTUM: Primary | ICD-10-CM

## 2022-09-22 DIAGNOSIS — O09.529 ENCOUNTER FOR SUPERVISION OF MULTIGRAVIDA WITH ADVANCED MATERNAL AGE: Primary | ICD-10-CM

## 2022-09-22 DIAGNOSIS — Z23 NEED FOR TDAP VACCINATION: ICD-10-CM

## 2022-09-22 PROCEDURE — 99207 PR NO CHARGE NURSE ONLY: CPT

## 2022-09-22 NOTE — PROGRESS NOTES
Important Information for Provider:     New ob nurse intake by phone, second pregnancy, AMA . Patient had pregnancy in 2015 with no complications. Handouts reviewed and mailed. Ordered genetic screening. Recommended B6, Unisom for nausea.   Patient had Covid 8/11/2022, concerns bout early pregnancy. Ultrasound scheduled for 10/03/2022 with CNM to call with results. NOB with CNM 10/20/2022    Caffeine intake/servings daily - 1 tea/coffee  Calcium intake/servings daily - 3  Exercise 5 times weekly - describe ; walks, runs, precautions given  Sunscreen used - Yes  Seatbelts used - Yes  Guns stored in the home - No  Self Breast Exam - Yes  Pap test up to date -  Yes  Eye exam up to date -  Yes  Dental exam up to date -  Yes  Immunizations reviewed and up to date - Yes  Abuse: Current or Past (Physical, Sexual or Emotional) - No  Do you feel safe in your environment - Yes  Do you cope well with stress - Yes      Prenatal OB Questionnaire  Patient supplied answers from flow sheet for:  Prenatal OB Questionnaire.  Past Medical History  Have you ever recieved care for your mental health? : No  Have you ever been in a major accident or suffered serious trauma?: No  Within the last year, has anyone hit, slapped, kicked or otherwise hurt you?: No  In the last year, has anyone forced you to have sex when you didn't want to?: No    Past Medical History 2   Have you ever received a blood transfusion?: No  Would you accept a blood transfusion if was medically recommended?: Yes  Does anyone in your home smoke?: No   Is your blood type Rh negative?: No  Have you ever ?: No  Have you been hospitalized for a nonsurgical reason excluding normal delivery?: No  Have you ever had an abnormal pap smear?: No    Past Medical History (Continued)  Do you have a history of abnormalities of the uterus?: No  Did your mother take MAGGY or any other hormones when she was pregnant with you?: No  Do you have any other problems we have not  asked about which you feel may be important to this pregnancy?: No                     Allergies as of 9/22/2022:    Allergies as of 09/22/2022 - Reviewed 09/22/2022   Allergen Reaction Noted     Sulfa drugs  07/07/2021       Current medications are:  Current Outpatient Medications   Medication Sig Dispense Refill     Ascorbic Acid (VITAMIN C PO)            Early ultrasound screening tool:    Does patient have irregular periods?  No  Did patient use hormonal birth control in the three months prior to positive urine pregnancy test? No  Is the patient breastfeeding?  No  Is the patient 10 weeks or greater at time of education visit?  No

## 2022-09-24 ENCOUNTER — HEALTH MAINTENANCE LETTER (OUTPATIENT)
Age: 37
End: 2022-09-24

## 2022-09-26 ENCOUNTER — THERAPY VISIT (OUTPATIENT)
Dept: PHYSICAL THERAPY | Facility: CLINIC | Age: 37
End: 2022-09-26
Payer: COMMERCIAL

## 2022-09-26 DIAGNOSIS — M54.50 ACUTE BILATERAL LOW BACK PAIN WITHOUT SCIATICA: ICD-10-CM

## 2022-09-26 DIAGNOSIS — M62.89 PELVIC FLOOR DYSFUNCTION IN FEMALE: Primary | ICD-10-CM

## 2022-09-26 DIAGNOSIS — M54.9 ACUTE MIDLINE BACK PAIN, UNSPECIFIED BACK LOCATION: ICD-10-CM

## 2022-09-26 PROCEDURE — 97140 MANUAL THERAPY 1/> REGIONS: CPT | Mod: GP | Performed by: PHYSICAL THERAPIST

## 2022-09-26 PROCEDURE — 97110 THERAPEUTIC EXERCISES: CPT | Mod: GP | Performed by: PHYSICAL THERAPIST

## 2022-10-03 ENCOUNTER — VIRTUAL VISIT (OUTPATIENT)
Dept: MIDWIFE SERVICES | Facility: CLINIC | Age: 37
End: 2022-10-03
Payer: COMMERCIAL

## 2022-10-03 ENCOUNTER — ANCILLARY PROCEDURE (OUTPATIENT)
Dept: ULTRASOUND IMAGING | Facility: CLINIC | Age: 37
End: 2022-10-03
Attending: ADVANCED PRACTICE MIDWIFE
Payer: COMMERCIAL

## 2022-10-03 DIAGNOSIS — Z34.90 INTRAUTERINE PREGNANCY: Primary | ICD-10-CM

## 2022-10-03 DIAGNOSIS — O09.529 ENCOUNTER FOR SUPERVISION OF MULTIGRAVIDA WITH ADVANCED MATERNAL AGE: ICD-10-CM

## 2022-10-03 PROCEDURE — 99207 PR NO CHARGE LOS: CPT | Performed by: ADVANCED PRACTICE MIDWIFE

## 2022-10-03 PROCEDURE — 76801 OB US < 14 WKS SINGLE FETUS: CPT | Performed by: OBSTETRICS & GYNECOLOGY

## 2022-10-03 NOTE — PROGRESS NOTES
Masha is a 37 year old who is being evaluated via a billable telephone visit.      What phone number would you like to be contacted at? [phone]    How would you like to obtain your AVS? Saima FAN:  Masha is seen for telephone visit to review dating and viability US. She has known LMP of 7/27/22 and known date of last intercourse as 8/6/22. LMP aligns with dating of 9w5d with KWAKU of 5/3/23. Date of intercourse/conception aligns with dating of 10w2d with KWAKU of 4/29/23.    O:  US shows woods viable IUP dated 11w0d with KWAKU 4/24/23,     A/P:  (Z34.90) Intrauterine pregnancy  (primary encounter diagnosis)  Comment: Discussed option for additional US to assess dating in 2 weeks. Known date of conception aligns with US today but LMP does not. Plan to use known date of conception for dating and adjust as needed with additional US. RTC for NOB.  Plan: US OB < 14 Weeks Single Transabdominal    AAMIR Horton CNM            Phone call duration: 7 minutes

## 2022-10-10 ENCOUNTER — THERAPY VISIT (OUTPATIENT)
Dept: PHYSICAL THERAPY | Facility: CLINIC | Age: 37
End: 2022-10-10
Payer: COMMERCIAL

## 2022-10-10 DIAGNOSIS — M62.89 PELVIC FLOOR DYSFUNCTION IN FEMALE: Primary | ICD-10-CM

## 2022-10-10 DIAGNOSIS — M54.9 ACUTE MIDLINE BACK PAIN, UNSPECIFIED BACK LOCATION: ICD-10-CM

## 2022-10-10 PROCEDURE — 97112 NEUROMUSCULAR REEDUCATION: CPT | Mod: GP | Performed by: PHYSICAL THERAPIST

## 2022-10-10 PROCEDURE — 97110 THERAPEUTIC EXERCISES: CPT | Mod: GP | Performed by: PHYSICAL THERAPIST

## 2022-10-14 ENCOUNTER — PRENATAL OFFICE VISIT (OUTPATIENT)
Dept: MIDWIFE SERVICES | Facility: CLINIC | Age: 37
End: 2022-10-14
Payer: COMMERCIAL

## 2022-10-14 ENCOUNTER — ANCILLARY PROCEDURE (OUTPATIENT)
Dept: ULTRASOUND IMAGING | Facility: CLINIC | Age: 37
End: 2022-10-14
Attending: ADVANCED PRACTICE MIDWIFE
Payer: COMMERCIAL

## 2022-10-14 VITALS — SYSTOLIC BLOOD PRESSURE: 125 MMHG | DIASTOLIC BLOOD PRESSURE: 81 MMHG | BODY MASS INDEX: 26.46 KG/M2 | WEIGHT: 174 LBS

## 2022-10-14 DIAGNOSIS — Z34.90 INTRAUTERINE PREGNANCY: ICD-10-CM

## 2022-10-14 DIAGNOSIS — O09.521 MULTIGRAVIDA OF ADVANCED MATERNAL AGE IN FIRST TRIMESTER: Primary | ICD-10-CM

## 2022-10-14 PROBLEM — O09.529 AMA (ADVANCED MATERNAL AGE) MULTIGRAVIDA 35+: Status: ACTIVE | Noted: 2022-10-14

## 2022-10-14 PROCEDURE — 99207 PR FIRST OB VISIT: CPT | Performed by: ADVANCED PRACTICE MIDWIFE

## 2022-10-14 PROCEDURE — 76801 OB US < 14 WKS SINGLE FETUS: CPT | Performed by: OBSTETRICS & GYNECOLOGY

## 2022-10-14 NOTE — PROGRESS NOTES
11w6d   Masha Spence is a 37 year old who presents to the clinic for an new ob visit. She is not a previous CNM patient for pregnancy but sees us for annual exams. They are happy about this pregnancy. Virgilio is here today. Olvin is excited as well. She is starting to feel better. Has genetic testing scheduled. Has flu vaccination. Had covid early pregnancy, discussed growth. Planning covid booster at some point but wants to wait today. No other questions or concerns today.       Estimated Date of Delivery: Apr 29, 2023 is calculated from Patient's last menstrual period was 07/27/2022.     She has not had bleeding since her LMP.   She has had mild nausea. Weigh loss has not occurred.  FOB is involved, Virgilio   OTHER CONCERNS: None    INFECTION HISTORY  HIV: no  Hepatitis B: no  Hepatitis C: no  Syphilis:  no  Tuberculosis: no   PPD- no  Herpes self: no  Herpes partner:  no  Chlamydia:  no  Gonorrhea:  no  HPV: no  BV:  no  Trichomonis:  no  Chicken Pox:  YES  ====================================================  GENETIC SCREENING  Genetic screening reviewed. High Risk? Paternal great uncle with albinism, maternal side with autoimmune diseases, and paternal side with cancers, son with epilepsy, outgrown now and no diagnosis was found.    ====================================================  PERSONAL/SOCIAL HISTORY  Lives lives with their family.  Employment: Full time as .  Her job involves moderate activity. 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   HX OF ABUSE: no  =====================================================   REVIEW OF SYSTEMS  CONSTITUTIONAL: NEGATIVE for fever, chills  EYES: NEGATIVE for vision changes   RESP: NEGATIVE for significant cough or SOB  CV: NEGATIVE for chest pain, palpitations   GI: NEGATIVE for nausea, abdominal pain, heartburn, or change in bowel habits  : NEGATIVE for frequency, dysuria, or hematuria  MUSCULOSKELETAL: NEGATIVE  for significant arthralgias or myalgia  NEURO: NEGATIVE for weakness, dizziness or paresthesias or headache  ====================================================    PHYSICAL EXAM:  LMP 2022   BMI- There is no height or weight on file to calculate BMI.,     RECOMMENDED WEIGHT GAIN: 15-25 lbs.  PHQ9- Today's Depression Rating was No Value exists for the : HP#PHQ9  GENERAL:  Pleasant pregnant female, alert, well groomed.   SKIN:  Warm and dry, without lesions or rashes  HEAD: Symmetrical features.  NECK:  Thyroid without enlargement and nodules.  Lymph nodes not palpable.   LUNGS:  Clear to auscultation.   HEART:  RRR without murmur.  ABDOMEN: Soft without masses , tenderness or organomegaly.  No CVA tenderness. No scars noted.     MUSCULOSKELETAL:  Full range of motion  EXTREMITIES:  No edema. No significant varicosities.   GENITALIA: deferred.    =========================================  ASSESSMENT:  11w6d  (O09.521) Multigravida of advanced maternal age in first trimester  (primary encounter diagnosis)    PREGNANCY AT RISK? no  ==========================================  PLAN:  Instructed on use of triage nurse line and contacting the on call CNM after hours for an urgent need such as fever, vagina bleeding, bladder or vaginal infection, rupture of membranes,  or term labor.    Discussed the indications, uses for and false positives for quad screen, nuchal translucency and fetal survey ultrasound at 18-20 weeks gestation. Scheduled with MFM  Instructed on best evidence for: weight gain for her BMI for pregnancy; healthy diet and foods to avoid; exercise and activity during pregnancy;avoiding exposure to toxoplasmosis; and maintenance of a generally healthy lifestyle.   Discussed the harms, benefits, side effects and alternative therapies for current prescribed and OTC medications.  Follow up in 4 weeks.     AAMIR Hargrove CNCATIA

## 2022-10-18 ENCOUNTER — PRE VISIT (OUTPATIENT)
Dept: MATERNAL FETAL MEDICINE | Facility: CLINIC | Age: 37
End: 2022-10-18

## 2022-10-19 LAB
ABO/RH(D): NORMAL
ANTIBODY SCREEN: NEGATIVE
SPECIMEN EXPIRATION DATE: NORMAL

## 2022-10-19 NOTE — PROGRESS NOTES
[facility] [place]  Genetic Counseling Consult    Patient: Masha Spence YOB: 1985   Date of Service: 10/20/22      Masha Spence was seen at [facility] for genetic consultation to discuss the options for screening and testing for fetal chromosome abnormalities.  The indication for genetic counseling is advanced maternal age. Masha was accompanied to the appointment today by her partner, Virgilio.         Impression/Plan:   1.  Masha had an ultrasound and blood draw for NIPT (NIPS test through [facility]). The patient wishes to know the predicted genetic sex of the pregnancy and has elected to proceed with sex chromosome aneuploidy analysis. Results are expected within 7-10 days, and will be available in [employer].  We will contact her to discuss the results, and a copy will be forwarded to the office of the referring OB provider. The patient provided verbal permission to leave a detailed message with results, including predicted fetal sex, on her voicemail. The patient was informed that results, including fetal sex, will be available via Dtime.      2.  Masha had a blood draw for expanded carrier screening (Comprehensive Carrier Screen, 289 conditions, through [facility]). Her partner Virgilio also had a blood draw for expanded carrier screening (Comprehensive Carrier Screen, 289 conditions, through [facility]) today. Results are expected within 14-21 days, and will be available in Epic. We will contact them to discuss the results, and a copy will be forwarded to the office of the referring OB provider. The couple requested that results be called out as they become available, even if both partners' results do not return on the same day. Both Masha and Virgilio provided verbal permission to leave a detailed voicemail summarizing results. The patient was informed that results will also be available via Dtime.  Consent to communicate forms were  completed in-person and are scanned into the couple's respective charts.      3.  Maternal serum AFP (single marker screen) is recommended after 15 weeks to screen for open neural tube defects. A quad screen should not be performed.    4.  An 18-20 week comprehensive ultrasound is standard of care for all women 35 or older at delivery.    Pregnancy History:   /Parity:    Age at Delivery: 38 year old  KWAKU: 2023, by Est. Date of Conception  Gestational Age: 12w5d    No significant complications or exposures were reported in the current pregnancy.    Masha nelson pregnancy history is significant for:  o 2015: Term , male (Olvin)    Medical History:   Masha nelson reported medical history is not expected to impact pregnancy management or risks to fetal development.       Family History:   A three-generation pedigree was obtained, and is scanned under the  Media  tab.   The following significant findings were reported by Masha:    The father of the pregnancy, Virgilio, is 38 and healthy.    Masha and Olvin have a 7 year old son, Olvin. Olvin has a history of seizures in early childhood. He had a seizure/epilepsy panel performed through [facility] which reportedly identified a VUS but did not identify a definitive cause of his seizures. Result not available for my review today. Olvin was also found to have sleep apnea and underwent a tonsillectomy and adenoidectomy. After this, he has not had any recurrence of seizures and has been off of all seizure medication for several years. He is otherwise healthy and typically developing. Seizure disorders including epilepsy can be isolated or part of a broader genetic syndrome. When isolated, seizure disorders often result from multiple interacting genetic and environmental factors and follow a multifactorial pattern of inheritance. Given there is thought to be a genetic component and this is a first degree relative to the pregnancy, the risk for recurrence may be increased in  the current pregnancy, however there is no prenatal test to determine if a fetus will develop a seizure disorder. Typically prenatal testing for a VUS identified in a relative is not recommended as the clinical significance of this result would be uncertain. The couple was encouraged to inform their pediatrician of their son's history.     Masha reports a maternal family history of autoimmune conditions. Her mother has Celiac disease, a maternal uncle had type 1 diabetes, and her maternal grandmother passed away from an autoimmune liver disease. Autoimmune disorders are thought to be multifactorial conditions, resulting from both genetic and non-genetic factors. Once an autoimmune disease is present in a family, other relatives may be at increased risk to develop the same disease or a different autoimmune disease.  Presymptomatic and prenatal testing are not available for autoimmune disorders.     Masha reports that two paternal aunts/uncles have albinism. Most cases of albinism are inherited in an autosomal recessive manner.     Virgilio reports that his mother had breast cancer in her mid 60's. His father had prostate cancer treated with surgery. His paternal grandmother passed away from bone cancer in her femur at age 55. No other family history of cancer was reported. Most cancer seen in families occurs sporadically, but some may be due to an underlying genetic etiology. This family history is not suspicious of an underlying hereditary cancer predisposition, however Virgilio was encouraged to follow up if any new information arises.       Otherwise, the reported family history is negative for multiple miscarriages, stillbirths, birth defects, intellectual disability, known genetic conditions, and consanguinity.       Carrier Screening:   The patient reports that she and the father of the pregnancy have  ancestry:     Cystic fibrosis is an autosomal recessive genetic condition that occurs with increased frequency  in individuals of  ancestry and carrier screening for this condition is available.  In addition,  screening in the [facility] includes cystic fibrosis.    The patient reports that the father of the pregnancy has  ancestry:     The hemoglobinopathies are a group of genetic blood diseases that occur with increased frequency in individuals of  ancestry and carrier screening for these conditions is available.  In addition,  screening in the [facility] includes many of the hemoglobinopathies.      Expanded carrier screening for mutations in a large panel of genes associated with autosomal recessive conditions including cystic fibrosis, thalassemias, hearing loss, spinal muscular atrophy, and others, is now available. We also reviewed that expanded carrier screening can assess for common X-linked recessive disorders such as Fragile X syndrome.     We reviewed availability of expanded carrier screening through [facility] for up to 289 conditions. Carrier screening can be done before or during a pregnancy. The purpose of carrier screening is providing an individual or couple with a personalized risk assessment for genetic conditions. This is accomplished by screening an individual to determine if they are a carrier for a genetic condition. For most conditions, both parents must be a carrier of the condition for the pregnancy to be at an increased risk. For other conditions that are X-linked, there is an increased risk when a female (mother) is a carrier and the concerns are greater if she passes that condition to a son.     The following was reviewed with Nuria as part of informed consent:      If both parents are carriers of an autosomal recessive condition, there are three possible outcomes for each pregnancy/child: 25% unaffected, 50% carrier, and 25% affected. The only method to determine the outcome or diagnose the condition in a pregnancy is an invasive testing  option such as an amniocentesis. Other couples will choose to test for the condition after delivery. While these conditions cannot be cured or treated during the pregnancy it may guide management recommendations (ultrasounds) for pregnancy as well as delivery and infant care.       We discussed that expanded carrier screening is designed to identify carrier status for conditions that are primarily childhood or adolescent onset. Expanded carrier screening does not evaluate for adult-onset conditions such as hereditary cancer syndromes, dementia/Alzheimer's disease, or cardiovascular disease risk factors. Additionally, expanded carrier screening is not comprehensive for all known genetic diseases or inherited conditions. This is a screening test, and residual carrier status risk figures will be provided to the patient after results become available.       Carrier screening is not meant to diagnose the patient with a condition, and generally carriers are asymptomatic. However, certain genes may confer increased risks for various health concerns in carriers (for example, but not limited to: FELIBERTO, FMR1, DMD). Additionally, we have no way of knowing if any life or disability insurance companies/policies could use this information to deny coverage for a patient.       We reviewed that carrier screening will report on variants classified by the lab as pathogenic or likely pathogenic. Although carrier status does not change over time, it is possible that a variant could be reclassified over time. If this occurs, the couple will be contacted and a new risk assessment will be provided.      There are implications for family members. If an individual is a carrier of a condition there is a chance for relatives to also be a carrier. This may be helpful information to disclose to family (siblings, cousins) so they may choose if they want to pursue carrier screening. In addition, if only one parent is found to be a carrier, there is  a 50% chance for each child to be a carrier. This may be helpful information for the patient's children when they start a family.      [facility] will contact the patient via text, email, or both with cost estimate information. The communication will list the cost billed through insurance and provide an option for self-pay. The default is insurance billing so patients must choose the self pay option and follow through with credit card information if desired. The self pay cost of carrier screening is $250 with partner carrier screening for $100. The patient has the responsibility to determine if insurance or self pay is a better financial decision.    The patient and her partner elected to pursue expanded carrier screening via [facility] today (Comprehensive Panel, 289 conditions). Results are expected within 14-21 days, and will be available in Epic. We will contact them to discuss the results, and a copy will be forwarded to the office of the referring OB provider. All of Masha's and her partner's questions were addressed to their apparent satisfaction today.      Risk Assessment for Chromosome Conditions:   We explained that the risk for fetal chromosome abnormalities increases with maternal age. We discussed specific features of common chromosome abnormalities, including Down syndrome, trisomy 13, trisomy 18, and sex chromosome trisomies.      - At age 38 at midtrimester, the risk to have a baby with Down syndrome is 1 in 98.     - At age 38 at midtrimester, the risk to have a baby with any chromosome abnormality is 1 in 51.     Testing Options:   We discussed the following options:  NT Ultrasound     Assessment for the thickness of the nuchal translucency and the fetus' nasal bone performed between 64w2o-62j3c gestation    ~70% aneuploidy detection      Non-invasive Prenatal Testing (NIPT)    Maternal plasma cell-free DNA testing; first trimester ultrasound with nuchal translucency and nasal bone assessment is  recommended, when appropriate    Screens for fetal trisomy 21, trisomy 13, trisomy 18, and sex chromosome aneuploidy    Cannot screen for open neural tube defects; maternal serum AFP after 15 weeks is recommended     Chorionic villus sampling (CVS)    Invasive procedure typically performed in the first trimester by which placental villi are obtained for the purpose of chromosome analysis and/or other prenatal genetic analysis    Diagnostic results; >99% sensitivity for fetal chromosome abnormalities    Cannot test for open neural tube defects; maternal serum AFP after 15 weeks is recommended     Genetic Amniocentesis    Invasive procedure typically performed in the second trimester by which amniotic fluid is obtained for the purpose of chromosome analysis and/or other prenatal genetic analysis    Diagnostic results; >99% sensitivity for fetal chromosome abnormalities    AFAFP measurement tests for open neural tube defects     Comprehensive (Level II) ultrasound: Detailed ultrasound performed between 18-22 weeks gestation to screen for major birth defects and markers for aneuploidy.    We reviewed the benefits and limitations of this testing.  Screening tests provide a risk assessment specific to the pregnancy for certain fetal chromosome abnormalities, but cannot definitively diagnose or exclude a fetal chromosome abnormality.  Follow-up genetic counseling and consideration of diagnostic testing is recommended with any abnormal screening result.     Diagnostic tests carry inherent risks- including risk of miscarriage- that require careful consideration.  These tests can detect fetal chromosome abnormalities with greater than 99% certainty.  Results can be compromised by maternal cell contamination or mosaicism, and are limited by the resolution of cytogenetic G-banding technology.  There is no screening nor diagnostic test that can detect all forms of birth defects or mental disability.     It was a pleasure to be  involved with Masha hunter. Face-to-face time of the meeting was 45 minutes.    Capri Reddy [facility], [facility]  Licensed Genetic Counselor  [facility]  Maternal Fetal Medicine  Phone: [phone]  [email or web address]

## 2022-10-20 ENCOUNTER — OFFICE VISIT (OUTPATIENT)
Dept: MATERNAL FETAL MEDICINE | Facility: CLINIC | Age: 37
End: 2022-10-20
Attending: ADVANCED PRACTICE MIDWIFE
Payer: COMMERCIAL

## 2022-10-20 ENCOUNTER — LAB (OUTPATIENT)
Dept: LAB | Facility: CLINIC | Age: 37
End: 2022-10-20
Attending: ADVANCED PRACTICE MIDWIFE
Payer: COMMERCIAL

## 2022-10-20 ENCOUNTER — HOSPITAL ENCOUNTER (OUTPATIENT)
Dept: ULTRASOUND IMAGING | Facility: CLINIC | Age: 37
Discharge: HOME | End: 2022-10-20
Attending: ADVANCED PRACTICE MIDWIFE
Payer: COMMERCIAL

## 2022-10-20 DIAGNOSIS — Z31.430 ENCOUNTER OF FEMALE FOR TESTING FOR GENETIC DISEASE CARRIER STATUS FOR PROCREATIVE MANAGEMENT: ICD-10-CM

## 2022-10-20 DIAGNOSIS — O09.519 ADVANCED MATERNAL AGE, PRIMIGRAVIDA, ANTEPARTUM: Primary | ICD-10-CM

## 2022-10-20 DIAGNOSIS — O09.521 MULTIGRAVIDA OF ADVANCED MATERNAL AGE IN FIRST TRIMESTER: Primary | ICD-10-CM

## 2022-10-20 DIAGNOSIS — Z36.9 ANTENATAL SCREENING ENCOUNTER: ICD-10-CM

## 2022-10-20 DIAGNOSIS — O09.529 ENCOUNTER FOR SUPERVISION OF MULTIGRAVIDA WITH ADVANCED MATERNAL AGE: ICD-10-CM

## 2022-10-20 DIAGNOSIS — O09.521 MULTIGRAVIDA OF ADVANCED MATERNAL AGE IN FIRST TRIMESTER: ICD-10-CM

## 2022-10-20 DIAGNOSIS — O26.90 PREGNANCY RELATED CONDITION, ANTEPARTUM: ICD-10-CM

## 2022-10-20 LAB
ERYTHROCYTE [DISTWIDTH] IN BLOOD BY AUTOMATED COUNT: 12.6 % (ref 10–15)
HBV SURFACE AG SERPL QL IA: NONREACTIVE
HCT VFR BLD AUTO: 37.9 % (ref 35–47)
HCV AB SERPL QL IA: NONREACTIVE
HGB BLD-MCNC: 13.5 G/DL (ref 11.7–15.7)
HIV 1+2 AB+HIV1 P24 AG SERPL QL IA: NONREACTIVE
MCH RBC QN AUTO: 30.5 PG (ref 26.5–33)
MCHC RBC AUTO-ENTMCNC: 35.6 G/DL (ref 31.5–36.5)
MCV RBC AUTO: 86 FL (ref 78–100)
PLATELET # BLD AUTO: 219 10E3/UL (ref 150–450)
RBC # BLD AUTO: 4.42 10E6/UL (ref 3.8–5.2)
T PALLIDUM AB SER QL: NONREACTIVE
WBC # BLD AUTO: 7.8 10E3/UL (ref 4–11)

## 2022-10-20 PROCEDURE — 76813 OB US NUCHAL MEAS 1 GEST: CPT

## 2022-10-20 PROCEDURE — 86803 HEPATITIS C AB TEST: CPT

## 2022-10-20 PROCEDURE — 81003 URINALYSIS AUTO W/O SCOPE: CPT

## 2022-10-20 PROCEDURE — 76813 OB US NUCHAL MEAS 1 GEST: CPT | Mod: 26 | Performed by: OBSTETRICS & GYNECOLOGY

## 2022-10-20 PROCEDURE — 86762 RUBELLA ANTIBODY: CPT

## 2022-10-20 PROCEDURE — 87086 URINE CULTURE/COLONY COUNT: CPT

## 2022-10-20 PROCEDURE — 87389 HIV-1 AG W/HIV-1&-2 AB AG IA: CPT

## 2022-10-20 PROCEDURE — 86901 BLOOD TYPING SEROLOGIC RH(D): CPT

## 2022-10-20 PROCEDURE — 86850 RBC ANTIBODY SCREEN: CPT

## 2022-10-20 PROCEDURE — 96040 HC GENETIC COUNSELING, EACH 30 MINUTES: CPT

## 2022-10-20 PROCEDURE — 85027 COMPLETE CBC AUTOMATED: CPT

## 2022-10-20 PROCEDURE — 86780 TREPONEMA PALLIDUM: CPT

## 2022-10-20 PROCEDURE — 87340 HEPATITIS B SURFACE AG IA: CPT

## 2022-10-20 PROCEDURE — 36415 COLL VENOUS BLD VENIPUNCTURE: CPT

## 2022-10-20 NOTE — PROGRESS NOTES
The patient was seen for an ultrasound in the Maternal-Fetal Medicine Center at the [facility]3] today.  For a detailed report of the ultrasound examination, please see the ultrasound report which can be found under the imaging tab.    Melani Coburn MD  , OB/GYN  Maternal-Fetal Medicine  [phone] (Pager)

## 2022-10-21 LAB
RUBV IGG SERPL QL IA: 1.77 INDEX
RUBV IGG SERPL QL IA: POSITIVE

## 2022-10-22 LAB
ALBUMIN UR-MCNC: NEGATIVE MG/DL
APPEARANCE UR: CLEAR
BACTERIA UR CULT: NORMAL
BILIRUB UR QL STRIP: NEGATIVE
COLOR UR AUTO: NORMAL
GLUCOSE UR STRIP-MCNC: NEGATIVE MG/DL
HGB UR QL STRIP: NEGATIVE
KETONES UR STRIP-MCNC: NEGATIVE MG/DL
LEUKOCYTE ESTERASE UR QL STRIP: NEGATIVE
NITRATE UR QL: NEGATIVE
PH UR STRIP: 6 [PH] (ref 5–7)
SP GR UR STRIP: 1.01 (ref 1–1.03)
UROBILINOGEN UR STRIP-MCNC: NORMAL MG/DL

## 2022-10-24 ENCOUNTER — TELEPHONE (OUTPATIENT)
Dept: MATERNAL FETAL MEDICINE | Facility: CLINIC | Age: 37
End: 2022-10-24

## 2022-10-24 LAB — SCANNED LAB RESULT: NORMAL

## 2022-10-24 NOTE — TELEPHONE ENCOUNTER
October 24, 2022    Left a message for Masha regarding her NIPT results.     Results indicate NO ANEUPLOIDY DETECTED for chromosomes 21, 18, 13, or sex chromosomes (XX). Fetal sex (female) was disclosed in the voicemail per plan established at time of visit.     This puts her current pregnancy at low risk for Down syndrome, trisomy 18, trisomy 13 and sex chromosome abnormalities. This test is reported to have the following sensitivities: Down syndrome: 99.99%, trisomy 18: 99.99%, and trisomy 13: 99.99%. Although these results are reassuring, this does not replace a standard chromosome analysis from a chorionic villus sampling or amniocentesis.     Level II ultrasound is recommended, given AMA. This is scheduled for 12/6/2022.     MSAFP is the appropriate second trimester screening test for open neural tube defects; the maternal quad screen is not recommended.    Her results are available in her Epic chart for her primary OB to review.    Carrier screening is still pending and Masha will be contacted when these results become available.      Capri Reddy, [facility], Located within [facility]  Licensed Genetic Counselor  [facility]  Maternal Fetal Medicine  Phone: [phone]  [email or web address]

## 2022-10-27 ENCOUNTER — TELEPHONE (OUTPATIENT)
Dept: MATERNAL FETAL MEDICINE | Facility: CLINIC | Age: 37
End: 2022-10-27

## 2022-10-27 LAB — SCANNED LAB RESULT: ABNORMAL

## 2022-10-27 NOTE — TELEPHONE ENCOUNTER
2022    Called Masha to discuss their expanded carrier screening results ([facility] Comprehensive Carrier Screen - 289 genes). The following was summarized in a voicemail message per previously established plan and Masha was encouraged to call me back with any additional questions.     Masha was found to be a carrier for one condition on the panel (described in detail below): Galactosemia    Carrier screening for Masha's partner Virgilio is pending. The couple will be contacted when these results are available.     Masha was found to be a carrier of the following condition(s):    Galactosemia (GALT c.563A>G (p.Hpb323Mpt)):  Galactosemia is an inherited condition that reduces an individual's ability to metabolize galactose, a simple sugar found in milk. Treatment is available for galactosemia by eliminating milk from the affected individual's diet. It is crucial this diet change occurs as early as possible to reduce the chance of intellectual disability and life-threatening complications. For this reason galactosemia is screened for in the [place]  Screening Program. Even with treatment, children can still develop cataracts, speech problems, stunted growth and motor function, and learning disabilities, and most females will eventually develop menstrual irregularities and go through premature menopause.      There is at minimum a 50% chance that the current pregnancy is a carrier of this condition. The risk that the current pregnancy is affected by this condition depends on Masha's partner's carrier status. The estimated carrier frequency for galactosemia for individuals of pan-ethnic ethnicity is 1 in 100. Carrier screening for Masha's partner is currently still pending.      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 Pseudodeficiency alleles are not known to be associated with disease. However, individuals with  pseudodeficiency alleles can exhibit false positive results on biochemical tests such as  screen. Krabbe disease is not on the [place] Screen. Masha is NOT a carrier of Krabbe disease.     A copy of this result will be available in Epic. Masha has access to this result via [employer].     Capri Reddy [facility], Located within [facility]  Licensed Genetic Counselor  [facility]  Maternal Fetal Medicine  Phone: [phone]  [email or web address]

## 2022-11-11 ENCOUNTER — PRENATAL OFFICE VISIT (OUTPATIENT)
Dept: MIDWIFE SERVICES | Facility: CLINIC | Age: 37
End: 2022-11-11
Payer: COMMERCIAL

## 2022-11-11 VITALS
BODY MASS INDEX: 27.19 KG/M2 | HEART RATE: 119 BPM | WEIGHT: 178.8 LBS | OXYGEN SATURATION: 97 % | SYSTOLIC BLOOD PRESSURE: 122 MMHG | DIASTOLIC BLOOD PRESSURE: 76 MMHG

## 2022-11-11 DIAGNOSIS — O09.522 MULTIGRAVIDA OF ADVANCED MATERNAL AGE IN SECOND TRIMESTER: Primary | ICD-10-CM

## 2022-11-11 DIAGNOSIS — Z23 HIGH PRIORITY FOR 2019-NCOV VACCINE: ICD-10-CM

## 2022-11-11 PROCEDURE — 99207 PR PRENATAL VISIT: CPT | Performed by: ADVANCED PRACTICE MIDWIFE

## 2022-11-11 PROCEDURE — 82105 ALPHA-FETOPROTEIN SERUM: CPT | Mod: 90 | Performed by: ADVANCED PRACTICE MIDWIFE

## 2022-11-11 PROCEDURE — 99000 SPECIMEN HANDLING OFFICE-LAB: CPT | Performed by: ADVANCED PRACTICE MIDWIFE

## 2022-11-11 PROCEDURE — 0124A COVID-19,PF,PFIZER BOOSTER BIVALENT: CPT | Performed by: ADVANCED PRACTICE MIDWIFE

## 2022-11-11 PROCEDURE — 36415 COLL VENOUS BLD VENIPUNCTURE: CPT | Performed by: ADVANCED PRACTICE MIDWIFE

## 2022-11-11 PROCEDURE — 91312 COVID-19,PF,PFIZER BOOSTER BIVALENT: CPT | Performed by: ADVANCED PRACTICE MIDWIFE

## 2022-11-11 NOTE — PROGRESS NOTES
15w6d  Masha is feeling well today. She is just getting over a cold. Interested in COVID-19 booster shot and will get today. NIPT testing WNL. Plan for msAFP today. RTC in 4 weeks for ultrasound and CNM visit to follow.    AAMIR HortonM

## 2022-11-15 LAB
# FETUSES US: NORMAL
AFP MOM SERPL: 0.81
AFP SERPL-MCNC: 24 NG/ML
AGE - REPORTED: 39.3 YR
CURRENT SMOKER: NO
FAMILY MEMBER DISEASES HX: NO
GA METHOD: NORMAL
GA: NORMAL WK
IDDM PATIENT QL: NO
INTEGRATED SCN PATIENT-IMP: NORMAL
SPECIMEN DRAWN SERPL: NORMAL

## 2022-11-21 NOTE — TELEPHONE ENCOUNTER
2022    Called Masha to discuss the couple's expanded carrier screening results ([facility] Comprehensive Carrier Screen - 289 genes). The following was summarized in a voicemail message per previously established plan and Masha was encouraged to call me back to discuss in more detail if he has any further questions.     Virgilio was found to be a carrier for two conditions on the panel (described in detail below): CFTR-related conditions and Zellweger spectrum disorder    Virgilio 's partner, Masha was found to be a carrier for one condition on the panel (described in detail below): Galactosemia     Overall, Masha and Virgilio were not found to be a carrier for the same condition. Therefore, the current pregnancy and any future conceptions between the couple are at low risk of any of the conditions included on this panel.     Masha was found to be a carrier of the following condition(s):               Galactosemia (GALT c.563A>G (p.[id number])):    Galactosemia is an inherited condition that reduces an individual's ability to metabolize galactose, a simple sugar found in milk. Treatment is available for galactosemia by eliminating milk from the affected individual's diet. It is crucial this diet change occurs as early as possible to reduce the chance of intellectual disability and life-threatening complications. For this reason galactosemia is screened for in the [place]  Screening Program. Even with treatment, children can still develop cataracts, speech problems, stunted growth and motor function, and learning disabilities, and most females will eventually develop menstrual irregularities and go through premature menopause.      Since Virgilio was NOT identified to be a carrier of this condition, the risk that the pregnancy is affected is significantly reduced. The residual risk provided by [facility] laboratory is 1 in 39,600.      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 Affected individuals typically experience breathing problems and lung infections that worsen over time. Affected individuals often also have pancreatic insufficiency. There is reduced life expectancy for CF but children born with CF are currently living longer than ever due to modern medicine. CFTR-related conditions include classic cystic fibrosis, isolated congenital absence of the vas deferens, variable respiratory manifestations, and hereditary pancreatitis.     This specific common, mild variant is noted to have low penetrance and often times individuals are asymptomatic. However this variant has been observed in individuals with congenital absence of the vas deferens (CAVD) in males when in trans with a second pathogenic CFTR variant. This variant is not expected to cause classic cystic fibrosis in either males or females, although in rare cases individuals may have borderline sweat chloride results or mild respiratory disease.    Since Masha was NOT identified to be a carrier of this condition, the risk that the pregnancy is affected is significantly reduced. The residual risk provided by [employer] is 1 in 3200.       Zellweger spectrum disorder (PEX6 c.10_216delins55 (p.Gos4Vislt*23)):     These conditions affected the peroxisomes, which are cellular structures that help break down fatty acids in the body.    There are several different genes associated with Zellweger spectrum disorders and can be sub-classified into four different conditions (Zellweger syndrome,  adrenoleukodystrophy, infantile Refsum disease, and Heimler syndrome).    ZSDs are progressive and typically result in a shortened lifespan. Age of onset and severity can be variable.     Since Masha was NOT identified to be a carrier of this condition, the risk that the  pregnancy is affected is significantly reduced. The residual risk provided by [facility] laboratory is 1 in 117,200.      Other results to note:    Pseudodeficiency alleles:    Masha and Virgilio were each additionally found to carry a pseudodeficiency allele in [facility]. Pseudodeficiency alleles are not known to be associated with disease. However, individuals with pseudodeficiency alleles can exhibit false positive results on biochemical tests such as  screen. Krabbe disease is not on the [place]  Screen. Nuria are NOT a carrier of Krabbe disease.     Additional information:     No further screening or testing for the couple or a future pregnancy is indicated.  Again, while the chances of the aforementioned conditions are low, because the detection rate of testing is not 100%, if there is ever concern for symptoms in the couple's children in the future, referral to an appropriate practitioner for evaluation is recommended.    If we are notified by the performing laboratory of a variant reclassification, the patient will be contacted.     The couple's children will have a 50% chance of being an unaffected carrier of the aforementioned conditions.  Genetic counseling for the couple's children is recommended when they are of reproductive age.    Primitivo can share this information with relatives if they feel comfortable. Siblings would be at a 50% chance of also being a carrier for the same condition.     A copy of this result will be available in Epic. Virgilio has access to this result via [employer].     Capri Reddy, [facility], [facility]  Licensed Genetic Counselor  [facility]  Maternal Fetal Medicine  Phone: [phone]  [email or web address]

## 2022-11-28 ENCOUNTER — THERAPY VISIT (OUTPATIENT)
Dept: PHYSICAL THERAPY | Facility: CLINIC | Age: 37
End: 2022-11-28
Payer: COMMERCIAL

## 2022-11-28 DIAGNOSIS — M54.9 ACUTE MIDLINE BACK PAIN, UNSPECIFIED BACK LOCATION: ICD-10-CM

## 2022-11-28 DIAGNOSIS — M62.89 PELVIC FLOOR DYSFUNCTION IN FEMALE: Primary | ICD-10-CM

## 2022-11-28 PROCEDURE — 97110 THERAPEUTIC EXERCISES: CPT | Mod: GP | Performed by: PHYSICAL THERAPIST

## 2022-11-28 PROCEDURE — 97530 THERAPEUTIC ACTIVITIES: CPT | Mod: GP | Performed by: PHYSICAL THERAPIST

## 2022-12-02 NOTE — PROGRESS NOTES
Discharge Note    Progress reporting period is from initial evaluation date (please see noted date below) to Nov 28, 2022.  Linked Episodes   Type: Episode: Status: Noted: Resolved: Last update: Updated by:   PHYSICAL THERAPY pelvic floor dysfunction 8/9/22 Active 8/9/2022 11/28/2022  5:31 PM Melani Beard, PT      Comments:       Masha failed to follow up and current status is unknown.  Please see information below for last relevant information on current status.  Patient seen for 4 visits.    SUBJECTIVE  Subjective changes noted by patient:  Pt reports that things are going well, is not having pain or PFD that she originally came in with before pregnancy.  Feels comfortable continuing with exercise and following up as needed further on in pregnancy if needed.  .  Current pain level is  .     Previous pain level was   .   Changes in function:  Yes (See Goal flowsheet attached for changes in current functional level)  Adverse reaction to treatment or activity: None    OBJECTIVE  Changes noted in objective findings:       ASSESSMENT/PLAN  Diagnosis: pelvic floor dysfunction   Updated problem list and treatment plan:   Decreased strength - HEP  Impaired muscle performance - HEP  STG/LTGs have been met or progress has been made towards goals:  Yes, please see goal flowsheet for most current information  Assessment of Progress: current status is unknown.    Last current status: Pt is progressing well   Self Management Plans:  HEP  I have re-evaluated this patient and find that the nature, scope, duration and intensity of the therapy is appropriate for the medical condition of the patient.  Masha continues to require the following intervention to meet STG and LTG's:  HEP.    Recommendations:  Discharge with current home program.  Patient to follow up with MD as needed.    Please refer to the daily flowsheet for treatment today, total treatment time and time spent performing 1:1 timed codes.

## 2022-12-06 ENCOUNTER — HOSPITAL ENCOUNTER (OUTPATIENT)
Dept: ULTRASOUND IMAGING | Facility: CLINIC | Age: 37
Discharge: HOME | End: 2022-12-06
Attending: OBSTETRICS & GYNECOLOGY
Payer: COMMERCIAL

## 2022-12-06 ENCOUNTER — OFFICE VISIT (OUTPATIENT)
Dept: MATERNAL FETAL MEDICINE | Facility: CLINIC | Age: 37
End: 2022-12-06
Attending: OBSTETRICS & GYNECOLOGY
Payer: COMMERCIAL

## 2022-12-06 ENCOUNTER — PRENATAL OFFICE VISIT (OUTPATIENT)
Dept: MIDWIFE SERVICES | Facility: CLINIC | Age: 37
End: 2022-12-06
Payer: COMMERCIAL

## 2022-12-06 VITALS
OXYGEN SATURATION: 100 % | WEIGHT: 184 LBS | SYSTOLIC BLOOD PRESSURE: 112 MMHG | BODY MASS INDEX: 27.98 KG/M2 | HEART RATE: 83 BPM | DIASTOLIC BLOOD PRESSURE: 68 MMHG

## 2022-12-06 DIAGNOSIS — O09.519 ADVANCED MATERNAL AGE, PRIMIGRAVIDA, ANTEPARTUM: ICD-10-CM

## 2022-12-06 DIAGNOSIS — O09.522 MULTIGRAVIDA OF ADVANCED MATERNAL AGE IN SECOND TRIMESTER: Primary | ICD-10-CM

## 2022-12-06 DIAGNOSIS — O35.9XX0 SUSPECTED FETAL ANOMALY, ANTEPARTUM, SINGLE OR UNSPECIFIED FETUS: ICD-10-CM

## 2022-12-06 PROCEDURE — 99207 PR PRENATAL VISIT: CPT | Performed by: ADVANCED PRACTICE MIDWIFE

## 2022-12-06 PROCEDURE — 76811 OB US DETAILED SNGL FETUS: CPT

## 2022-12-06 PROCEDURE — 82105 ALPHA-FETOPROTEIN SERUM: CPT | Mod: 90 | Performed by: ADVANCED PRACTICE MIDWIFE

## 2022-12-06 PROCEDURE — 99000 SPECIMEN HANDLING OFFICE-LAB: CPT | Performed by: ADVANCED PRACTICE MIDWIFE

## 2022-12-06 PROCEDURE — 36415 COLL VENOUS BLD VENIPUNCTURE: CPT | Performed by: ADVANCED PRACTICE MIDWIFE

## 2022-12-06 PROCEDURE — 76811 OB US DETAILED SNGL FETUS: CPT | Mod: 26 | Performed by: OBSTETRICS & GYNECOLOGY

## 2022-12-06 NOTE — PROGRESS NOTES
"Please see \"Imaging\" tab under \"Chart Review\" for details of today's US at the [facility].    Richie Johnson MD  Maternal-Fetal Medicine      "

## 2022-12-06 NOTE — PROGRESS NOTES
19w3d  Patient is feeling well. Denies any vaginal bleeding, water leaking, abdominal pain, or other concerns. Starting to feel baby moving.   Discussed genetic testing. Needs AFP today. Had M ultrasound just prior to this appointment. Everything looks good, just unable to see full anatomy so has a follow up scheduled in a few weeks. She is feeling well. Looking into getting a , gave info for [facility] [employer]. No other questions or concerns tdoay.   Danger signs discussed. Patient knows when to call triage and has numbers to call.   Follow up in 5 weeks for GCT and HGB.   Alejandra Krishna CNM

## 2022-12-09 LAB
# FETUSES US: NORMAL
AFP MOM SERPL: 1.2
AFP SERPL-MCNC: 57 NG/ML
AGE - REPORTED: 39.3 YR
CURRENT SMOKER: NO
FAMILY MEMBER DISEASES HX: NO
GA METHOD: NORMAL
GA: NORMAL WK
IDDM PATIENT QL: NO
INTEGRATED SCN PATIENT-IMP: NORMAL
SPECIMEN DRAWN SERPL: NORMAL

## 2023-01-02 ENCOUNTER — HOSPITAL ENCOUNTER (OUTPATIENT)
Dept: ULTRASOUND IMAGING | Facility: CLINIC | Age: 38
Discharge: HOME | End: 2023-01-02
Attending: OBSTETRICS & GYNECOLOGY
Payer: COMMERCIAL

## 2023-01-02 ENCOUNTER — OFFICE VISIT (OUTPATIENT)
Dept: MATERNAL FETAL MEDICINE | Facility: CLINIC | Age: 38
End: 2023-01-02
Attending: OBSTETRICS & GYNECOLOGY
Payer: COMMERCIAL

## 2023-01-02 DIAGNOSIS — O35.9XX0 SUSPECTED FETAL ANOMALY, ANTEPARTUM, SINGLE OR UNSPECIFIED FETUS: ICD-10-CM

## 2023-01-02 DIAGNOSIS — U07.1 COVID-19 AFFECTING PREGNANCY, ANTEPARTUM: Primary | ICD-10-CM

## 2023-01-02 DIAGNOSIS — O98.519 COVID-19 AFFECTING PREGNANCY, ANTEPARTUM: Primary | ICD-10-CM

## 2023-01-02 PROCEDURE — 76816 OB US FOLLOW-UP PER FETUS: CPT | Mod: 26 | Performed by: OBSTETRICS & GYNECOLOGY

## 2023-01-02 PROCEDURE — 76816 OB US FOLLOW-UP PER FETUS: CPT

## 2023-01-02 NOTE — PROGRESS NOTES
"Please see \"Imaging\" tab under \"Chart Review\" for details of today's visit.    Shanika Gutierrez    "

## 2023-01-13 ENCOUNTER — PRENATAL OFFICE VISIT (OUTPATIENT)
Dept: MIDWIFE SERVICES | Facility: CLINIC | Age: 38
End: 2023-01-13
Payer: COMMERCIAL

## 2023-01-13 VITALS
HEART RATE: 82 BPM | OXYGEN SATURATION: 100 % | DIASTOLIC BLOOD PRESSURE: 61 MMHG | BODY MASS INDEX: 29.35 KG/M2 | WEIGHT: 193 LBS | SYSTOLIC BLOOD PRESSURE: 112 MMHG

## 2023-01-13 DIAGNOSIS — Z13.1 SCREENING FOR DIABETES MELLITUS: ICD-10-CM

## 2023-01-13 DIAGNOSIS — O09.522 MULTIGRAVIDA OF ADVANCED MATERNAL AGE IN SECOND TRIMESTER: Primary | ICD-10-CM

## 2023-01-13 LAB
GLUCOSE 1H P 50 G GLC PO SERPL-MCNC: 98 MG/DL (ref 70–129)
HGB BLD-MCNC: 11.4 G/DL (ref 11.7–15.7)
HOLD SPECIMEN: NORMAL

## 2023-01-13 PROCEDURE — 99207 PR PRENATAL VISIT: CPT | Performed by: ADVANCED PRACTICE MIDWIFE

## 2023-01-13 PROCEDURE — 36415 COLL VENOUS BLD VENIPUNCTURE: CPT | Performed by: ADVANCED PRACTICE MIDWIFE

## 2023-01-13 PROCEDURE — 82950 GLUCOSE TEST: CPT | Performed by: ADVANCED PRACTICE MIDWIFE

## 2023-01-14 NOTE — PROGRESS NOTES
Masha is here for PNC with GCT today.   No concerns.  Repeat US for fetal anatomy complete and normal.   ASSESSMENT: 24w6d   PLAN: RTC in 4 weeks.

## 2023-02-10 ENCOUNTER — PRENATAL OFFICE VISIT (OUTPATIENT)
Dept: MIDWIFE SERVICES | Facility: CLINIC | Age: 38
End: 2023-02-10
Payer: COMMERCIAL

## 2023-02-10 VITALS
DIASTOLIC BLOOD PRESSURE: 65 MMHG | SYSTOLIC BLOOD PRESSURE: 109 MMHG | WEIGHT: 193.1 LBS | HEART RATE: 87 BPM | BODY MASS INDEX: 29.27 KG/M2 | HEIGHT: 68 IN | TEMPERATURE: 97.7 F

## 2023-02-10 DIAGNOSIS — Z34.83 ENCOUNTER FOR SUPERVISION OF OTHER NORMAL PREGNANCY IN THIRD TRIMESTER: Primary | ICD-10-CM

## 2023-02-10 DIAGNOSIS — Z00.00 ROUTINE GENERAL MEDICAL EXAMINATION AT A HEALTH CARE FACILITY: ICD-10-CM

## 2023-02-10 DIAGNOSIS — Z86.16 HISTORY OF COVID-19: ICD-10-CM

## 2023-02-10 PROCEDURE — 90715 TDAP VACCINE 7 YRS/> IM: CPT | Performed by: ADVANCED PRACTICE MIDWIFE

## 2023-02-10 PROCEDURE — 90471 IMMUNIZATION ADMIN: CPT | Performed by: ADVANCED PRACTICE MIDWIFE

## 2023-02-10 PROCEDURE — 99207 PR PRENATAL VISIT: CPT | Performed by: ADVANCED PRACTICE MIDWIFE

## 2023-02-10 NOTE — PROGRESS NOTES
28w6d   Feeling well, questions about end of pregnancy schedule and timing , reviewed.  Questions about an extra fluoride toothpaste, not a lot of data, pt decided she may wait until after pregnancy to start it.  Got TDAP today, has growth U/S scheduled.   RTC 4 wks AAMIR MoranM

## 2023-03-10 ENCOUNTER — OFFICE VISIT (OUTPATIENT)
Dept: MATERNAL FETAL MEDICINE | Facility: CLINIC | Age: 38
End: 2023-03-10
Attending: OBSTETRICS & GYNECOLOGY
Payer: COMMERCIAL

## 2023-03-10 ENCOUNTER — HOSPITAL ENCOUNTER (OUTPATIENT)
Dept: ULTRASOUND IMAGING | Facility: CLINIC | Age: 38
Discharge: HOME | End: 2023-03-10
Attending: OBSTETRICS & GYNECOLOGY
Payer: COMMERCIAL

## 2023-03-10 ENCOUNTER — PRENATAL OFFICE VISIT (OUTPATIENT)
Dept: MIDWIFE SERVICES | Facility: CLINIC | Age: 38
End: 2023-03-10
Payer: COMMERCIAL

## 2023-03-10 VITALS
DIASTOLIC BLOOD PRESSURE: 66 MMHG | HEART RATE: 87 BPM | SYSTOLIC BLOOD PRESSURE: 123 MMHG | OXYGEN SATURATION: 98 % | BODY MASS INDEX: 29.65 KG/M2 | WEIGHT: 195 LBS

## 2023-03-10 DIAGNOSIS — U07.1 COVID-19 AFFECTING PREGNANCY IN THIRD TRIMESTER: Primary | ICD-10-CM

## 2023-03-10 DIAGNOSIS — O09.523 MULTIGRAVIDA OF ADVANCED MATERNAL AGE IN THIRD TRIMESTER: Primary | ICD-10-CM

## 2023-03-10 DIAGNOSIS — U07.1 COVID-19 AFFECTING PREGNANCY, ANTEPARTUM: ICD-10-CM

## 2023-03-10 DIAGNOSIS — O98.513 COVID-19 AFFECTING PREGNANCY IN THIRD TRIMESTER: Primary | ICD-10-CM

## 2023-03-10 DIAGNOSIS — O98.519 COVID-19 AFFECTING PREGNANCY, ANTEPARTUM: ICD-10-CM

## 2023-03-10 PROCEDURE — 99207 PR PRENATAL VISIT: CPT | Performed by: ADVANCED PRACTICE MIDWIFE

## 2023-03-10 PROCEDURE — 76816 OB US FOLLOW-UP PER FETUS: CPT

## 2023-03-10 PROCEDURE — 76816 OB US FOLLOW-UP PER FETUS: CPT | Mod: 26 | Performed by: OBSTETRICS & GYNECOLOGY

## 2023-03-10 NOTE — PROGRESS NOTES
Please see the imaging tab for details of the ultrasound performed today.    Alejandra Brantley MD  Specialist in Maternal-Fetal Medicine

## 2023-03-10 NOTE — PROGRESS NOTES
32w6d  Patient is feeling well. Positive fetal movement. Denies water leaking, vaginal bleeding, decreased fetal movement, contraction pain, or headaches.   Doing well. Here for follow up after MFM growth due to covid during pregnancy. Results not back yet, per patient WNL and cephalic position. Wondering about some birthing processes here. Delivered in [place] last time. She found a  to join for the delivery. She had a  last delivery as well. No pain medications. Pitocin with pushing stage. No other concerns today.   Danger signs reviewed, pre-eclampsia signs and symptoms discussed.   Knows when to call triage and has phone numbers.   Follow up in 2 weeks.   AAMIR Hargrove CNM

## 2023-03-24 ENCOUNTER — PRENATAL OFFICE VISIT (OUTPATIENT)
Dept: MIDWIFE SERVICES | Facility: CLINIC | Age: 38
End: 2023-03-24
Payer: COMMERCIAL

## 2023-03-24 VITALS
SYSTOLIC BLOOD PRESSURE: 109 MMHG | WEIGHT: 196.5 LBS | BODY MASS INDEX: 29.88 KG/M2 | DIASTOLIC BLOOD PRESSURE: 70 MMHG | HEART RATE: 82 BPM | TEMPERATURE: 98 F

## 2023-03-24 DIAGNOSIS — O09.523 MULTIGRAVIDA OF ADVANCED MATERNAL AGE IN THIRD TRIMESTER: Primary | ICD-10-CM

## 2023-03-24 PROCEDURE — 99207 PR PRENATAL VISIT: CPT | Performed by: ADVANCED PRACTICE MIDWIFE

## 2023-03-24 NOTE — PROGRESS NOTES
34w6d  Masha is feeling well today. Reports good fetal movement. Denies leaking of fluid, vaginal bleeding, regular uterine contractions, headache, visual changes, or other concerns. General questions about phone number to call, how to park to get to hospital, etc. Discussed upcoming GBS, Hgb, BSUS. RTC weekly.    AAMIR Horton CNM

## 2023-04-07 ENCOUNTER — PRENATAL OFFICE VISIT (OUTPATIENT)
Dept: MIDWIFE SERVICES | Facility: CLINIC | Age: 38
End: 2023-04-07
Payer: COMMERCIAL

## 2023-04-07 VITALS
BODY MASS INDEX: 30.35 KG/M2 | HEART RATE: 86 BPM | DIASTOLIC BLOOD PRESSURE: 69 MMHG | WEIGHT: 199.6 LBS | SYSTOLIC BLOOD PRESSURE: 119 MMHG | TEMPERATURE: 97.8 F

## 2023-04-07 DIAGNOSIS — Z34.83 ENCOUNTER FOR SUPERVISION OF OTHER NORMAL PREGNANCY IN THIRD TRIMESTER: Primary | ICD-10-CM

## 2023-04-07 LAB
HGB BLD-MCNC: 12 G/DL (ref 11.7–15.7)
HOLD SPECIMEN: NORMAL

## 2023-04-07 PROCEDURE — 36415 COLL VENOUS BLD VENIPUNCTURE: CPT | Performed by: ADVANCED PRACTICE MIDWIFE

## 2023-04-07 PROCEDURE — 99207 PR PRENATAL VISIT: CPT | Performed by: ADVANCED PRACTICE MIDWIFE

## 2023-04-07 PROCEDURE — 87653 STREP B DNA AMP PROBE: CPT | Performed by: ADVANCED PRACTICE MIDWIFE

## 2023-04-07 ASSESSMENT — ANXIETY QUESTIONNAIRES
7. FEELING AFRAID AS IF SOMETHING AWFUL MIGHT HAPPEN: NOT AT ALL
2. NOT BEING ABLE TO STOP OR CONTROL WORRYING: NOT AT ALL
3. WORRYING TOO MUCH ABOUT DIFFERENT THINGS: NOT AT ALL
GAD7 TOTAL SCORE: 1
GAD7 TOTAL SCORE: 1
5. BEING SO RESTLESS THAT IT IS HARD TO SIT STILL: NOT AT ALL
IF YOU CHECKED OFF ANY PROBLEMS ON THIS QUESTIONNAIRE, HOW DIFFICULT HAVE THESE PROBLEMS MADE IT FOR YOU TO DO YOUR WORK, TAKE CARE OF THINGS AT HOME, OR GET ALONG WITH OTHER PEOPLE: NOT DIFFICULT AT ALL
6. BECOMING EASILY ANNOYED OR IRRITABLE: SEVERAL DAYS
1. FEELING NERVOUS, ANXIOUS, OR ON EDGE: NOT AT ALL

## 2023-04-07 ASSESSMENT — PATIENT HEALTH QUESTIONNAIRE - PHQ9
5. POOR APPETITE OR OVEREATING: NOT AT ALL
SUM OF ALL RESPONSES TO PHQ QUESTIONS 1-9: 2

## 2023-04-07 NOTE — PROGRESS NOTES
36w6d   Feeling well.  Has a  from [facility] and attended a recent birthing class.  Is hoping for minimal intervention and no IV if possible.  Discussed usual practice is IV access with admission but could see what was happening at the time.  Would like option of WB, education given.  GBS and hgb today.  RTC 1 wk AAMIR MoranM

## 2023-04-07 NOTE — PATIENT INSTRUCTIONS
Water Birth Patient Education    Many studies have reported on the benefits of water birth, who is a safe candidate for water birth, as well the risks of water birth including the potential for rare complications    Research has shown:    - For many people, immersion in warm water is a helpful tool for pain management and freedom of movement.  People who give birth in water use less pain medicine.  - Labor may be shorter, especially for people having their first baby.  - Vaginal tears are more common, but less severe.  - Many people have increase satisfaction and have a positive birth experience.  - There is less need for medicine to speed up labor when using water immersion during labor.    Potential Complications of water birth include:    - Trouble lifting the baby out of the water to take their first breath (this can happen when the umbilical cord is too short to allow them to surface to the water)  - A torn umbilical cord, which can typically be easily managed but can lead to blood loss for baby.  - Baby may breath in or swallow the tub water.  - It can be difficult to know how much blood is lost during birth when in the water.  - In the event of an emergency, it may be more difficult or take more time to help the patient and baby if they must first move form the pool to the bed.    If you want to use the water birth pool during labor, these are some expectations that are important to know:    - Use of water birth pool for hydrotherapy and/or birth is intended for pregnant patients experiencing a low risk, uncomplicated pregnancy.  You must be at least 37 weeks pregnant and the baby is in the head down position.    - Testing has been completed during pregnancy to show that you do not have HIV, Hepatitis B, or Hepatitis C.  Water birth is not an option for people who have an infection that can spread through blood and other body fluids.  Discuss COVID-19 recommendations with your provider.  - Your blood  pressure, pulse, temperature, and baby's heart rate must be normal throughout the labor and delivery.  - The care team will perform cervical checks to help determine the best tome to get into the pool.  Water birth works best for patients in active labor.  - The care team will be able to listen to the baby's heart rate as needed when you are in the pool with a hand held doppler.  - It may become necessary for you to get out of the pool.  The care team let you know when and why this might need to happen.  - Only patients are allowed in the pool.  Someone must be with you in the room at all times while you are in the pool.  - The care team will help you out of the pool a few minutes after the birth of baby for delivery of the placenta out of the water, in the bed.    Water birth may not be possible for all patients due to safety reasons and [facility] has a detailed guideline listing the eligibility requirements.

## 2023-04-08 LAB — GP B STREP DNA SPEC QL NAA+PROBE: NEGATIVE

## 2023-04-14 ENCOUNTER — PRENATAL OFFICE VISIT (OUTPATIENT)
Dept: MIDWIFE SERVICES | Facility: CLINIC | Age: 38
End: 2023-04-14
Payer: COMMERCIAL

## 2023-04-14 VITALS
TEMPERATURE: 98.1 F | WEIGHT: 199 LBS | HEART RATE: 91 BPM | BODY MASS INDEX: 30.26 KG/M2 | DIASTOLIC BLOOD PRESSURE: 72 MMHG | SYSTOLIC BLOOD PRESSURE: 116 MMHG

## 2023-04-14 DIAGNOSIS — Z34.83 ENCOUNTER FOR SUPERVISION OF OTHER NORMAL PREGNANCY, THIRD TRIMESTER: Primary | ICD-10-CM

## 2023-04-14 PROCEDURE — 99207 PR PRENATAL VISIT: CPT | Performed by: ADVANCED PRACTICE MIDWIFE

## 2023-04-14 NOTE — PROGRESS NOTES
37w6d  Feeling well. Baby active. No questions or concerns. Reports good fetal movement. Denies leaking of fluid, vaginal bleeding, regular uterine contractions, headache or other concerns.  RTC in 1wk HKJ

## 2023-04-21 ENCOUNTER — PRENATAL OFFICE VISIT (OUTPATIENT)
Dept: MIDWIFE SERVICES | Facility: CLINIC | Age: 38
End: 2023-04-21
Payer: COMMERCIAL

## 2023-04-21 VITALS
SYSTOLIC BLOOD PRESSURE: 105 MMHG | DIASTOLIC BLOOD PRESSURE: 72 MMHG | WEIGHT: 202 LBS | HEART RATE: 90 BPM | BODY MASS INDEX: 30.71 KG/M2 | OXYGEN SATURATION: 100 %

## 2023-04-21 DIAGNOSIS — Z34.83 ENCOUNTER FOR SUPERVISION OF OTHER NORMAL PREGNANCY, THIRD TRIMESTER: Primary | ICD-10-CM

## 2023-04-21 PROBLEM — Z23 NEED FOR TDAP VACCINATION: Status: RESOLVED | Noted: 2022-09-22 | Resolved: 2023-04-21

## 2023-04-21 PROCEDURE — 99207 PR PRENATAL VISIT: CPT | Performed by: ADVANCED PRACTICE MIDWIFE

## 2023-04-21 NOTE — PROGRESS NOTES
No real concerns.  No ctx yet.  Has Diaz planned to assist and will labor at home for as long as possible is her plan.  Knows where to park and call.  ASSESSMENT: 38w6d   PLAN: RTC next week for PNC and then post dates testing.

## 2023-04-28 ENCOUNTER — PRENATAL OFFICE VISIT (OUTPATIENT)
Dept: MIDWIFE SERVICES | Facility: CLINIC | Age: 38
End: 2023-04-28
Payer: COMMERCIAL

## 2023-04-28 VITALS
SYSTOLIC BLOOD PRESSURE: 110 MMHG | WEIGHT: 202 LBS | HEART RATE: 84 BPM | OXYGEN SATURATION: 100 % | BODY MASS INDEX: 30.71 KG/M2 | DIASTOLIC BLOOD PRESSURE: 73 MMHG

## 2023-04-28 DIAGNOSIS — Z34.83 ENCOUNTER FOR SUPERVISION OF OTHER NORMAL PREGNANCY, THIRD TRIMESTER: Primary | ICD-10-CM

## 2023-04-28 PROCEDURE — 99207 PR PRENATAL VISIT: CPT | Performed by: ADVANCED PRACTICE MIDWIFE

## 2023-04-28 NOTE — PROGRESS NOTES
39w6d  Feeling well. Reports good fetal movement. Denies leaking of fluid, vaginal bleeding, regular uterine contractions, headache or other concerns.Discussed postdates testing.   RTC in 1 wk for loyd and nst. SARA

## 2023-05-04 ENCOUNTER — OFFICE VISIT (OUTPATIENT)
Dept: OBGYN | Facility: CLINIC | Age: 38
End: 2023-05-04
Attending: ADVANCED PRACTICE MIDWIFE
Payer: COMMERCIAL

## 2023-05-04 ENCOUNTER — PRENATAL OFFICE VISIT (OUTPATIENT)
Dept: MIDWIFE SERVICES | Facility: CLINIC | Age: 38
End: 2023-05-04
Attending: ADVANCED PRACTICE MIDWIFE
Payer: COMMERCIAL

## 2023-05-04 VITALS
BODY MASS INDEX: 30.82 KG/M2 | SYSTOLIC BLOOD PRESSURE: 136 MMHG | HEART RATE: 110 BPM | DIASTOLIC BLOOD PRESSURE: 86 MMHG | WEIGHT: 202.7 LBS

## 2023-05-04 DIAGNOSIS — O48.0 POST-TERM PREGNANCY, 40-42 WEEKS OF GESTATION: Primary | ICD-10-CM

## 2023-05-04 PROCEDURE — 99207 PR PRENATAL VISIT: CPT | Performed by: ADVANCED PRACTICE MIDWIFE

## 2023-05-04 PROCEDURE — 59025 FETAL NON-STRESS TEST: CPT | Performed by: ADVANCED PRACTICE MIDWIFE

## 2023-05-04 NOTE — LETTER
May 4, 2023      Masha Spence  [address] [place] [address]        To Whom It May Concern:    Masha Spence  was seen on 5/4/2023 for prenatal care.  She was instructed to stop working as of Monday 5/8/2023. She should start her maternity leave at this time.         Sincerely,        AAMIR Monroy CNM

## 2023-05-04 NOTE — PROGRESS NOTES
40w5d  Patient presents with Virgilio to clinic for post dates testing. Was late for LUIZA US so this was rescheduled to Tuesday of next week. NST done for post dates is reactive - baseline 135, moderate variability, +accels, -decels. One contraction noted on TOCO in 25 minutes. Baby is active. No regular contractions, LOF or bleeding. Declines cervical exam today. Has phone numbers and knows where to go. RTC on Tuesday for US for LUIZA and CNM visit. MML

## 2023-05-05 ENCOUNTER — MEDICAL CORRESPONDENCE (OUTPATIENT)
Dept: HEALTH INFORMATION MANAGEMENT | Facility: CLINIC | Age: 38
End: 2023-05-05
Payer: COMMERCIAL

## 2023-05-09 ENCOUNTER — ANESTHESIA (OUTPATIENT)
Dept: OBGYN | Facility: CLINIC | Age: 38
End: 2023-05-09
Payer: COMMERCIAL

## 2023-05-09 ENCOUNTER — HOSPITAL ENCOUNTER (INPATIENT)
Facility: CLINIC | Age: 38
LOS: 2 days | Discharge: HOME HEALTH | End: 2023-05-11
Attending: ADVANCED PRACTICE MIDWIFE | Admitting: ADVANCED PRACTICE MIDWIFE
Payer: COMMERCIAL

## 2023-05-09 ENCOUNTER — ANESTHESIA EVENT (OUTPATIENT)
Dept: OBGYN | Facility: CLINIC | Age: 38
End: 2023-05-09
Payer: COMMERCIAL

## 2023-05-09 ENCOUNTER — NURSE TRIAGE (OUTPATIENT)
Dept: NURSING | Facility: CLINIC | Age: 38
End: 2023-05-09

## 2023-05-09 LAB
ABO/RH(D): NORMAL
ANTIBODY SCREEN: NEGATIVE
ERYTHROCYTE [DISTWIDTH] IN BLOOD BY AUTOMATED COUNT: 13.2 % (ref 10–15)
HCT VFR BLD AUTO: 37.1 % (ref 35–47)
HGB BLD-MCNC: 13 G/DL (ref 11.7–15.7)
MCH RBC QN AUTO: 30.4 PG (ref 26.5–33)
MCHC RBC AUTO-ENTMCNC: 35 G/DL (ref 31.5–36.5)
MCV RBC AUTO: 87 FL (ref 78–100)
PLATELET # BLD AUTO: 161 10E3/UL (ref 150–450)
RBC # BLD AUTO: 4.28 10E6/UL (ref 3.8–5.2)
SARS-COV-2 RNA RESP QL NAA+PROBE: NEGATIVE
SPECIMEN EXPIRATION DATE: NORMAL
T PALLIDUM AB SER QL: NONREACTIVE
WBC # BLD AUTO: 13.6 10E3/UL (ref 4–11)

## 2023-05-09 PROCEDURE — 86780 TREPONEMA PALLIDUM: CPT | Performed by: ADVANCED PRACTICE MIDWIFE

## 2023-05-09 PROCEDURE — U0005 INFEC AGEN DETEC AMPLI PROBE: HCPCS | Performed by: ADVANCED PRACTICE MIDWIFE

## 2023-05-09 PROCEDURE — 85018 HEMOGLOBIN: CPT | Performed by: ADVANCED PRACTICE MIDWIFE

## 2023-05-09 PROCEDURE — 258N000003 HC RX IP 258 OP 636: Performed by: ADVANCED PRACTICE MIDWIFE

## 2023-05-09 PROCEDURE — 250N000011 HC RX IP 250 OP 636: Performed by: STUDENT IN AN ORGANIZED HEALTH CARE EDUCATION/TRAINING PROGRAM

## 2023-05-09 PROCEDURE — 36415 COLL VENOUS BLD VENIPUNCTURE: CPT | Performed by: ADVANCED PRACTICE MIDWIFE

## 2023-05-09 PROCEDURE — 250N000013 HC RX MED GY IP 250 OP 250 PS 637: Performed by: ADVANCED PRACTICE MIDWIFE

## 2023-05-09 PROCEDURE — 250N000009 HC RX 250: Performed by: ADVANCED PRACTICE MIDWIFE

## 2023-05-09 PROCEDURE — 370N000003 HC ANESTHESIA WARD SERVICE: Performed by: STUDENT IN AN ORGANIZED HEALTH CARE EDUCATION/TRAINING PROGRAM

## 2023-05-09 PROCEDURE — 86901 BLOOD TYPING SEROLOGIC RH(D): CPT | Performed by: ADVANCED PRACTICE MIDWIFE

## 2023-05-09 PROCEDURE — 120N000002 HC R&B MED SURG/OB UMMC

## 2023-05-09 PROCEDURE — 258N000003 HC RX IP 258 OP 636: Performed by: STUDENT IN AN ORGANIZED HEALTH CARE EDUCATION/TRAINING PROGRAM

## 2023-05-09 PROCEDURE — 250N000009 HC RX 250

## 2023-05-09 RX ORDER — KETOROLAC TROMETHAMINE 30 MG/ML
30 INJECTION, SOLUTION INTRAMUSCULAR; INTRAVENOUS
Status: DISCONTINUED | OUTPATIENT
Start: 2023-05-09 | End: 2023-05-11 | Stop reason: HOSPADM

## 2023-05-09 RX ORDER — MISOPROSTOL 200 UG/1
400 TABLET ORAL
Status: DISCONTINUED | OUTPATIENT
Start: 2023-05-09 | End: 2023-05-10 | Stop reason: HOSPADM

## 2023-05-09 RX ORDER — ONDANSETRON 4 MG/1
4 TABLET, ORALLY DISINTEGRATING ORAL EVERY 6 HOURS PRN
Status: DISCONTINUED | OUTPATIENT
Start: 2023-05-09 | End: 2023-05-10 | Stop reason: HOSPADM

## 2023-05-09 RX ORDER — METOCLOPRAMIDE HYDROCHLORIDE 5 MG/ML
10 INJECTION INTRAMUSCULAR; INTRAVENOUS EVERY 6 HOURS PRN
Status: DISCONTINUED | OUTPATIENT
Start: 2023-05-09 | End: 2023-05-10 | Stop reason: HOSPADM

## 2023-05-09 RX ORDER — NALOXONE HYDROCHLORIDE 0.4 MG/ML
0.2 INJECTION, SOLUTION INTRAMUSCULAR; INTRAVENOUS; SUBCUTANEOUS
Status: DISCONTINUED | OUTPATIENT
Start: 2023-05-09 | End: 2023-05-10 | Stop reason: HOSPADM

## 2023-05-09 RX ORDER — METOCLOPRAMIDE 10 MG/1
10 TABLET ORAL EVERY 6 HOURS PRN
Status: DISCONTINUED | OUTPATIENT
Start: 2023-05-09 | End: 2023-05-10 | Stop reason: HOSPADM

## 2023-05-09 RX ORDER — METHYLERGONOVINE MALEATE 0.2 MG/ML
200 INJECTION INTRAVENOUS
Status: DISCONTINUED | OUTPATIENT
Start: 2023-05-09 | End: 2023-05-10 | Stop reason: HOSPADM

## 2023-05-09 RX ORDER — MISOPROSTOL 200 UG/1
800 TABLET ORAL
Status: DISCONTINUED | OUTPATIENT
Start: 2023-05-09 | End: 2023-05-10 | Stop reason: HOSPADM

## 2023-05-09 RX ORDER — FENTANYL CITRATE-0.9 % NACL/PF 10 MCG/ML
PLASTIC BAG, INJECTION (ML) INTRAVENOUS
Status: DISCONTINUED
Start: 2023-05-09 | End: 2023-05-10 | Stop reason: WASHOUT

## 2023-05-09 RX ORDER — FENTANYL/ROPIVACAINE/NS/PF 2MCG/ML-.1
PLASTIC BAG, INJECTION (ML) EPIDURAL
Status: DISCONTINUED
Start: 2023-05-09 | End: 2023-05-10 | Stop reason: HOSPADM

## 2023-05-09 RX ORDER — OXYTOCIN 10 [USP'U]/ML
INJECTION, SOLUTION INTRAMUSCULAR; INTRAVENOUS
Status: DISCONTINUED
Start: 2023-05-09 | End: 2023-05-09 | Stop reason: HOSPADM

## 2023-05-09 RX ORDER — PROCHLORPERAZINE 25 MG
25 SUPPOSITORY, RECTAL RECTAL EVERY 12 HOURS PRN
Status: DISCONTINUED | OUTPATIENT
Start: 2023-05-09 | End: 2023-05-10 | Stop reason: HOSPADM

## 2023-05-09 RX ORDER — LIDOCAINE HYDROCHLORIDE 10 MG/ML
INJECTION, SOLUTION EPIDURAL; INFILTRATION; INTRACAUDAL; PERINEURAL
Status: DISCONTINUED
Start: 2023-05-09 | End: 2023-05-09 | Stop reason: HOSPADM

## 2023-05-09 RX ORDER — OXYTOCIN 10 [USP'U]/ML
10 INJECTION, SOLUTION INTRAMUSCULAR; INTRAVENOUS
Status: DISCONTINUED | OUTPATIENT
Start: 2023-05-09 | End: 2023-05-11 | Stop reason: HOSPADM

## 2023-05-09 RX ORDER — LIDOCAINE 40 MG/G
CREAM TOPICAL
Status: DISCONTINUED | OUTPATIENT
Start: 2023-05-09 | End: 2023-05-10 | Stop reason: HOSPADM

## 2023-05-09 RX ORDER — ONDANSETRON 2 MG/ML
4 INJECTION INTRAMUSCULAR; INTRAVENOUS EVERY 6 HOURS PRN
Status: DISCONTINUED | OUTPATIENT
Start: 2023-05-09 | End: 2023-05-10 | Stop reason: HOSPADM

## 2023-05-09 RX ORDER — MISOPROSTOL 200 UG/1
TABLET ORAL
Status: DISCONTINUED
Start: 2023-05-09 | End: 2023-05-09 | Stop reason: HOSPADM

## 2023-05-09 RX ORDER — OXYTOCIN/0.9 % SODIUM CHLORIDE 30/500 ML
340 PLASTIC BAG, INJECTION (ML) INTRAVENOUS CONTINUOUS PRN
Status: COMPLETED | OUTPATIENT
Start: 2023-05-09 | End: 2023-05-09

## 2023-05-09 RX ORDER — NALOXONE HYDROCHLORIDE 0.4 MG/ML
0.4 INJECTION, SOLUTION INTRAMUSCULAR; INTRAVENOUS; SUBCUTANEOUS
Status: DISCONTINUED | OUTPATIENT
Start: 2023-05-09 | End: 2023-05-10 | Stop reason: HOSPADM

## 2023-05-09 RX ORDER — NALBUPHINE HYDROCHLORIDE 10 MG/ML
2.5-5 INJECTION, SOLUTION INTRAMUSCULAR; INTRAVENOUS; SUBCUTANEOUS EVERY 6 HOURS PRN
Status: DISCONTINUED | OUTPATIENT
Start: 2023-05-09 | End: 2023-05-11 | Stop reason: HOSPADM

## 2023-05-09 RX ORDER — OXYTOCIN/0.9 % SODIUM CHLORIDE 30/500 ML
PLASTIC BAG, INJECTION (ML) INTRAVENOUS
Status: DISCONTINUED
Start: 2023-05-09 | End: 2023-05-09 | Stop reason: HOSPADM

## 2023-05-09 RX ORDER — PROCHLORPERAZINE MALEATE 10 MG
10 TABLET ORAL EVERY 6 HOURS PRN
Status: DISCONTINUED | OUTPATIENT
Start: 2023-05-09 | End: 2023-05-10 | Stop reason: HOSPADM

## 2023-05-09 RX ORDER — FENTANYL/ROPIVACAINE/NS/PF 2MCG/ML-.1
PLASTIC BAG, INJECTION (ML) EPIDURAL
Status: DISCONTINUED | OUTPATIENT
Start: 2023-05-09 | End: 2023-05-10 | Stop reason: HOSPADM

## 2023-05-09 RX ORDER — SODIUM CHLORIDE, SODIUM LACTATE, POTASSIUM CHLORIDE, CALCIUM CHLORIDE 600; 310; 30; 20 MG/100ML; MG/100ML; MG/100ML; MG/100ML
INJECTION, SOLUTION INTRAVENOUS CONTINUOUS
Status: DISCONTINUED | OUTPATIENT
Start: 2023-05-09 | End: 2023-05-11 | Stop reason: HOSPADM

## 2023-05-09 RX ORDER — OXYTOCIN 10 [USP'U]/ML
10 INJECTION, SOLUTION INTRAMUSCULAR; INTRAVENOUS
Status: DISCONTINUED | OUTPATIENT
Start: 2023-05-09 | End: 2023-05-10 | Stop reason: HOSPADM

## 2023-05-09 RX ORDER — OXYTOCIN/0.9 % SODIUM CHLORIDE 30/500 ML
100-340 PLASTIC BAG, INJECTION (ML) INTRAVENOUS CONTINUOUS PRN
Status: DISCONTINUED | OUTPATIENT
Start: 2023-05-09 | End: 2023-05-11 | Stop reason: HOSPADM

## 2023-05-09 RX ORDER — IBUPROFEN 800 MG/1
800 TABLET, FILM COATED ORAL
Status: DISCONTINUED | OUTPATIENT
Start: 2023-05-09 | End: 2023-05-11 | Stop reason: HOSPADM

## 2023-05-09 RX ORDER — TERBUTALINE SULFATE 1 MG/ML
0.25 INJECTION, SOLUTION SUBCUTANEOUS
Status: DISCONTINUED | OUTPATIENT
Start: 2023-05-09 | End: 2023-05-10 | Stop reason: HOSPADM

## 2023-05-09 RX ORDER — CARBOPROST TROMETHAMINE 250 UG/ML
250 INJECTION, SOLUTION INTRAMUSCULAR
Status: DISCONTINUED | OUTPATIENT
Start: 2023-05-09 | End: 2023-05-10 | Stop reason: HOSPADM

## 2023-05-09 RX ORDER — FENTANYL CITRATE 50 UG/ML
100 INJECTION, SOLUTION INTRAMUSCULAR; INTRAVENOUS
Status: DISCONTINUED | OUTPATIENT
Start: 2023-05-09 | End: 2023-05-10 | Stop reason: HOSPADM

## 2023-05-09 RX ORDER — FENTANYL CITRATE-0.9 % NACL/PF 10 MCG/ML
100 PLASTIC BAG, INJECTION (ML) INTRAVENOUS EVERY 5 MIN PRN
Status: DISCONTINUED | OUTPATIENT
Start: 2023-05-09 | End: 2023-05-10 | Stop reason: HOSPADM

## 2023-05-09 RX ORDER — CITRIC ACID/SODIUM CITRATE 334-500MG
30 SOLUTION, ORAL ORAL
Status: DISCONTINUED | OUTPATIENT
Start: 2023-05-09 | End: 2023-05-10 | Stop reason: HOSPADM

## 2023-05-09 RX ORDER — OXYTOCIN/0.9 % SODIUM CHLORIDE 30/500 ML
1-24 PLASTIC BAG, INJECTION (ML) INTRAVENOUS CONTINUOUS
Status: DISCONTINUED | OUTPATIENT
Start: 2023-05-09 | End: 2023-05-10 | Stop reason: HOSPADM

## 2023-05-09 RX ORDER — SODIUM CHLORIDE, SODIUM LACTATE, POTASSIUM CHLORIDE, CALCIUM CHLORIDE 600; 310; 30; 20 MG/100ML; MG/100ML; MG/100ML; MG/100ML
INJECTION, SOLUTION INTRAVENOUS CONTINUOUS PRN
Status: DISCONTINUED | OUTPATIENT
Start: 2023-05-09 | End: 2023-05-10 | Stop reason: HOSPADM

## 2023-05-09 RX ORDER — TRANEXAMIC ACID 10 MG/ML
1 INJECTION, SOLUTION INTRAVENOUS EVERY 30 MIN PRN
Status: DISCONTINUED | OUTPATIENT
Start: 2023-05-09 | End: 2023-05-10 | Stop reason: HOSPADM

## 2023-05-09 RX ORDER — CALCIUM CARBONATE 500 MG/1
500 TABLET, CHEWABLE ORAL DAILY PRN
Status: DISCONTINUED | OUTPATIENT
Start: 2023-05-09 | End: 2023-05-11 | Stop reason: HOSPADM

## 2023-05-09 RX ADMIN — BUPIVACAINE HYDROCHLORIDE 5 ML: 2.5 INJECTION, SOLUTION EPIDURAL; INFILTRATION; INTRACAUDAL at 18:43

## 2023-05-09 RX ADMIN — BUPIVACAINE HYDROCHLORIDE 5 ML: 2.5 INJECTION, SOLUTION EPIDURAL; INFILTRATION; INTRACAUDAL at 18:46

## 2023-05-09 RX ADMIN — Medication 2 MILLI-UNITS/MIN: at 22:30

## 2023-05-09 RX ADMIN — SODIUM CHLORIDE, POTASSIUM CHLORIDE, SODIUM LACTATE AND CALCIUM CHLORIDE 1000 ML: 600; 310; 30; 20 INJECTION, SOLUTION INTRAVENOUS at 19:36

## 2023-05-09 RX ADMIN — CALCIUM CARBONATE (ANTACID) CHEW TAB 500 MG 500 MG: 500 CHEW TAB at 23:52

## 2023-05-09 RX ADMIN — Medication: at 18:57

## 2023-05-09 RX ADMIN — Medication 2 ML/HR: at 22:31

## 2023-05-09 ASSESSMENT — ACTIVITIES OF DAILY LIVING (ADL)
CONCENTRATING,_REMEMBERING_OR_MAKING_DECISIONS_DIFFICULTY: NO
ADLS_ACUITY_SCORE: 31
ADLS_ACUITY_SCORE: 18
TOILETING_ISSUES: NO
DRESSING/BATHING_DIFFICULTY: NO
FALL_HISTORY_WITHIN_LAST_SIX_MONTHS: NO
CHANGE_IN_FUNCTIONAL_STATUS_SINCE_ONSET_OF_CURRENT_ILLNESS/INJURY: NO
ADLS_ACUITY_SCORE: 18
DIFFICULTY_EATING/SWALLOWING: NO
ADLS_ACUITY_SCORE: 18
WALKING_OR_CLIMBING_STAIRS_DIFFICULTY: NO
ADLS_ACUITY_SCORE: 18
ADLS_ACUITY_SCORE: 18
WEAR_GLASSES_OR_BLIND: NO
ADLS_ACUITY_SCORE: 18
ADLS_ACUITY_SCORE: 18
DOING_ERRANDS_INDEPENDENTLY_DIFFICULTY: NO

## 2023-05-09 ASSESSMENT — LIFESTYLE VARIABLES: TOBACCO_USE: 0

## 2023-05-09 NOTE — PROVIDER NOTIFICATION
05/09/23 1656   Provider Notification   Provider Name/Title Alejandra Krishna CNM   Method of Notification In Department   Notification Reason SVE     Patient is wanting a cervical check to see labor progression

## 2023-05-09 NOTE — PROGRESS NOTES
"S: Coping well in tub; desired position change and to get out of the tub. Is now standing leaning over the bed swaying. Feeling some increased pressure that is sustained in between contractions, but still not an urge to push. Open to trying side-lying release.     Support: Annabel supportive at beside. Providing massage and sacral counter pressure.    O:  Blood pressure 123/67, pulse 99, temperature 98.9  F (37.2  C), temperature source Oral, resp. rate 18, height 1.727 m (5' 8\"), weight 91.9 kg (202 lb 11.2 oz), last menstrual period 2022, not currently breastfeeding.    IA: (pt meets criteria and desires IA monitoring):  Baseline rate: 130  Regular heart rate rhythm  Increases in fetal heart rate Not Present  Decreases in fetal heart rate Not Present  Interventions performed: No interventions needed at this time   Uterine activity: Contraction frequency 2-3mins, length 60 sec, intensity strong  FHTs auscultated by nurse while provider at bedside    ROM: ruptured for 13.5 hrs, clear fluid, afebrile  PELVIC EXAM:PELVIC EXAM: deferred  Bloody show: noted following side-lying release    ASSESSMENT:  ==============  Masha Spence  38 year old  female  Estimated Date of Delivery: 2023  IUP @ 41w3d active labor   FHR Category I by IA  Uterine Activity: normal uterine activity   GBS- negative    Patient Active Problem List   Diagnosis     Acute bilateral low back pain without sciatica     AMA (advanced maternal age) multigravida 35+     Labor and delivery indication for care or intervention       PLAN:  ===========  Comfort measures PRN  Lando of movement to facilitate labor progress  Anticipate   Reevaluate in 1-2 hours or sooner PRN    Shana FRAGA SNM am serving as a scribe; to document services personally performed by  AAMIR Angeles CNM based on data collection and the provider's statements to me.     LESLI Puga APRN CNM   "

## 2023-05-09 NOTE — PROVIDER NOTIFICATION
05/09/23 1308   Provider Notification   Provider Name/Title Alejandra Krishna CNM   Method of Notification Electronic Page   Notification Reason SVE     The patient is feeling pressure in between contractions. Please come and assess.

## 2023-05-09 NOTE — PROGRESS NOTES
"S: Masha is doing well. Moving rooms to prepare for a water birth. Going to get in a tub now in the room. Feels like things are moving along well, feeling more pressure on her cervix. No questions or concerns at this time.     O:  Blood pressure 100/57, pulse 99, temperature 98.4  F (36.9  C), temperature source Oral, resp. rate 18, height 1.727 m (5' 8\"), weight 91.9 kg (202 lb 11.2 oz), last menstrual period 2022, not currently breastfeeding.  General appearance: comfortable    CONTACTIONS: Contractions every 3-5 minutes.  Palpate: moderate  FETAL HEART TONES: baseline 135 with moderate FHR variability and    accelerations yes. Decelerations no. IA monitoring.   NST: REACTIVE  ROM: clear fluid  PELVIC EXAM: deferred  Fetal Position: cephalic   Bloody show: No  Pitocin- none,  Antibiotics- none  Cervical ripening: N/A    ASSESSMENT:  ==============  IUP @ 41w3d early labor   Fetal Heart rate tracing category one  GBS- negative     PLAN:  ===========  Comfort measures prn   Anticipate      Alejandra Krishna, AAMIR VERDUGO    "

## 2023-05-09 NOTE — H&P
ADMIT NOTE  =================  41w3d  Masha Spence is a 38 year old female     with an Patient's last menstrual period was 2022. and Estimated Date of Delivery: 2023 is [facility] on 2023 at 5:37 AM in early active labor.   Fetal movement- active  ROM- yes, moderate clear   GBS- negative    HPI  ================  Patient reports she had mild intermittent contractions throughout the day yesterday but they started to become more regular and strong around 2300. She called the nurse line around 0145 and arrived to unit at 0435 with , Siddharth, and Jolly colon. Leaking clear fluid since 0100, no bleeding, baby is active.     PRENATAL COURSE  =================  Prenatal course was   complicated by    Patient Active Problem List    Diagnosis Date Noted     Labor and delivery indication for care or intervention 2023     Priority: Medium     AMA (advanced maternal age) multigravida 35+ 10/14/2022     Priority: Medium     Acute bilateral low back pain without sciatica 2022     Priority: Medium        HISTORIES  ============  Allergies   Allergen Reactions     Ciprofloxacin Other (See Comments) and Swelling     Sulfa Antibiotics      rashes     Past Medical History:   Diagnosis Date     Anemia      Gluten-sensitive enteropathy      Granuloma annulare      Rosacea      Varicella      Past Surgical History:   Procedure Laterality Date     CYST REMOVAL      fatty tissue cyst, benign, on neck, left side     fibrous papule of nose      removed by derm   .  Family History   Problem Relation Age of Onset     Depression Mother      Migraines Mother      Other - See Comments Father         lump on breast     Migraines Maternal Grandmother      Liver Disease Maternal Grandmother         autoimmune liver disease     Depression Maternal Grandfather      Other - See Comments Paternal Grandmother         lump on breast     Seizure Disorder Son         from age 3-6 he took  "medication     Diabetes Maternal Uncle      Social History     Tobacco Use     Smoking status: Never     Smokeless tobacco: Never   Vaping Use     Vaping status: Never Used   Substance Use Topics     Alcohol use: Not Currently     OB History    Para Term  AB Living   2 1 1 0 0 1   SAB IAB Ectopic Multiple Live Births   0 0 0 0 1      # Outcome Date GA Lbr Jonnie/2nd Weight Sex Delivery Anes PTL Lv   2 Current            1 Term 01/12/15 41w0d  3.629 kg (8 lb) M Vag-Spont   CARLINE      Name: Olvin        LABS:   ===========  Rhogam not indicated  Blood type O POS  Antibody neg  Rubella Immune  Syphilis NR  HIV NR  GBS NEG  GCT 98    Lab Results   Component Value Date    HGB 12.0 2023    HGB 13.2 2021      Lab Results   Component Value Date    HEPBANG Nonreactive 10/20/2022     ROS  =========  Pt denies significant respiratory, cardiovacular, GI, or muscular/skeletalcomplaints.    See RN data base ROS.     PHYSICAL EXAM:  ===============  Blood pressure 100/57, pulse 99, temperature 98.4  F (36.9  C), temperature source Oral, resp. rate 18, height 1.727 m (5' 8\"), weight 91.9 kg (202 lb 11.2 oz), last menstrual period 2022, not currently breastfeeding.  General appearance: uncomfortable with contractions  Heart: RRR without murmur  Lungs: clear to auscultation   Neuro: denies headache and visual disturbances  Psych: Mentation normal and bright   Legs: 2+/2+, no clonus, no edema      Abdomen: gravid, single vertex fetus, non-tender between contractions.  EFW-  8 lbs.   CONTRACTIONS: Contractions every 3-5 minutes.  Palpate: mild and moderate  FETAL HEART TONES: baseline 140 with moderate FHR variability and  pos accelerations. No decelerations present.      PELVIC EXAM: 4/80/+1/soft/anterior  SAUCEDO SCORE: 12  BLOODY SHOW: no   ROM: Yes  FLUID: clear    ASSESSMENT:  ==============  IUP @ 41w3d in early active labor   AMA  NST REACTIVE  Fetal Heart rate tracing Category one  GBS- negative  COVID " pending     PLAN:  ===========  Admit - see IP orders  Patient declines IV - discussed may need to put one in during labor or postpartum if needed for augmentation or to manage bleeding. Pt verbalizes understanding.  Admission labs - CBC, T&S, syphilis and COVID  Pain medication per patient request - planning unmedicated, interested in hydrotherapy and possibly water birth   Anticipate    AAMIR MonroyM

## 2023-05-09 NOTE — PROVIDER NOTIFICATION
05/09/23 1207   Provider Notification   Provider Name/Title Alejandra Krishna CNM   Method of Notification Electronic Page   Notification Reason SVE     Pt feeling pressure and would like cervical check.

## 2023-05-09 NOTE — TELEPHONE ENCOUNTER
Having contractions  4 min - 10 min -   11 pm    [facility]    OB Triage Call      Is patient's OB/Midwife with the formerly [facility] or [facility]? [facility]- Proceed with triage     Reason for call: Contractions started 5pm; says contractions are 4-10 min apart.    Assessment: page midwives    Plan: page midwives    Patient plans to deliver at [facility]    Patient's primary OB Provider is midwives.    Paged Ellie at 12:47 am to call FNA  Can wait a little longer or can go in now-- up to patient      Per protocol recommendations Consult needed for [facility] MD or Midwife.  Patient's primary OB is [facility]. Paged on-call midwife for patient's primary OB clinic (refer to where patient is seen as midwives may go to multiple locations) Ellie Allen to call FNA back at 23 12:47 am.  Call returned at 12:48 am and advised on triage assessment. Does midwife recommend L&D evaluation? Yes-  Labor and delivery at [facility] ([phone]) notified of patient's pending arrival. Patient notified to go to L&D by WINDY Paniagua, OB nurse, was notified to expect patient in the next several hours as patient is waiting for her ; patient is about 15 min away from the hospital.    Is patient's delivering hospital on divert? No        41w3d    Estimated Date of Delivery: 2023        OB History    Para Term  AB Living   2 1 1 0 0 1   SAB IAB Ectopic Multiple Live Births   0 0 0 0 1      # Outcome Date GA Lbr Jonnie/2nd Weight Sex Delivery Anes PTL Lv   2 Current            1 Term 01/12/15 41w0d  3.629 kg (8 lb) M Vag-Spont   CARLINE      Name: Olvin       No results found for: GBS       Seferino Rizo RN 23 12:39 AM  [facility] Nurse Advisor      Reason for Disposition    [1] History of prior delivery (multipara) AND [2] contractions < 10 minutes apart AND [3] present 1 hour    Additional Information    Negative: Passed out (i.e., lost consciousness,  "collapsed and was not responding)    Negative: Shock suspected (e.g., cold/pale/clammy skin, too weak to stand, low BP, rapid pulse)    Negative: Difficult to awaken or acting confused (e.g., disoriented, slurred speech)    Negative: [1] SEVERE abdominal pain (e.g., excruciating) AND [2] constant AND [3] present > 1 hour    Negative: Severe bleeding (e.g., continuous red blood from vagina, or large blood clots)    Negative: Umbilical cord hanging out of the vagina (shiny, white, curled appearance, \"like telephone cord\")    Negative: Uncontrollable urge to push (i.e., feels like baby is coming out now)    Negative: Can see baby    Negative: Sounds like a life-threatening emergency to the triager    Negative: [1] First baby (primipara) AND [2] contractions < 6 minutes apart  AND [3] present 2 hours    Protocols used: PREGNANCY - LABOR-A-AH      "

## 2023-05-09 NOTE — PROGRESS NOTES
"S: Masha continues to cope well with labor. She is currently in water birth tub, rocking her hips and vocalizing. Using frequent position changes in the tub. States she can feel baby's head about a fingertip inside her vagina when she feels during contractions and would like cervical exam. Not feeling an urge to push, states did not feel an overwhelming urge even when complete with her first baby.     O:  Blood pressure 123/67, pulse 99, temperature 98.9  F (37.2  C), temperature source Oral, resp. rate 18, height 1.727 m (5' 8\"), weight 91.9 kg (202 lb 11.2 oz), last menstrual period 2022, not currently breastfeeding.    IA: (pt meets criteria and desires IA monitoring):  Baseline rate: 140  Regular heart rate rhythm  Increases in fetal heart rate Not Present  Decreases in fetal heart rate Not Present  Interventions performed: No interventions needed at this time   Uterine activity: Contraction frequency 3mins, length 40-60 sec, palpate strong  FHTs auscultated by nurse while provider at bedside    ROM: ruptured since 0100, clear fluid  PELVIC EXAM:PELVIC EXAM: 7/ 100%/ 0    ASSESSMENT:  ==============  Masha Spence  38 year old  female  Estimated Date of Delivery: 2023  IUP @ 41w3d active labor, progressing   FHR Category I by IA   Uterine Activity: normal uterine activity   GBS- negative    Patient Active Problem List   Diagnosis     Acute bilateral low back pain without sciatica     AMA (advanced maternal age) multigravida 35+     Labor and delivery indication for care or intervention        PLAN:  ===========  Comfort measures PRN  Frequent position changes in tub to facilitate labor progress, consider hands and knees if patient is agreeable  Encourage oral hydration and nutrition as desired  Anticipate    Reassess in 2-4 hours or sooner PRN    Shana FRAGA SNM am serving as a scribe; to document services personally performed by  AAMIR Angeles, LUCINA based on data " collection and the provider's statements to me.     LESLI Puga APRN CNM

## 2023-05-09 NOTE — PROVIDER NOTIFICATION
05/09/23 0954   Provider Notification   Provider Name/Title Alejandra Krishna CNM   Method of Notification Electronic Page   Notification Reason SVE     Patient would like a cervical check

## 2023-05-09 NOTE — PROGRESS NOTES
"S: Masha is swaying and vocalizing through contractions, coping well though tired. States she did not get to sleep last night with regular contractions through the night and she is now really feeling the impact. Her  has been supporting her through a variety of upright positions and techniques to encourage labor progress, abdominal lift and tucks, lunges, mountain pose. Swaying and vocalizing through contractions and desires to stay out of tub; no longer interested in a water birth.  Accepts cervical exam.     O:  Blood pressure 121/63, pulse 99, temperature 98.3  F (36.8  C), temperature source Oral, resp. rate 20, height 1.727 m (5' 8\"), weight 91.9 kg (202 lb 11.2 oz), last menstrual period 2022, not currently breastfeeding.    IA: (pt meets criteria and desires IA monitoring):  Baseline rate: 120  Regular heart rate rhythm  Increases in fetal heart rate Present  Decreases in fetal heart rate Not Present  Interventions performed: No interventions needed at this time   Uterine activity: Contraction frequency 2-5mins, length 60 sec, intensity strong   FHTs auscultated by nurse while provider at bedside    ROM: ruptured 16 hrs, clear fluid, afebrile  PELVIC EXAM:PELVIC EXAM: 8/ 100%/ 0, edematous anterior cervix. Posterior fontanelle palpated posteriorly    ASSESSMENT:  ==============  Masha PABLO Spence  38 year old  female  Estimated Date of Delivery: 2023  IUP @ 41w3d active labor, slow progress   FHR Category I by IA  Uterine Activity: normal uterine activity, contractions spacing   GBS- negative  OP fetal position    Patient Active Problem List   Diagnosis     Acute bilateral low back pain without sciatica     AMA (advanced maternal age) multigravida 35+     Labor and delivery indication for care or intervention       PLAN:  ===========  Discussed pharmacologic options for pain management including nitrous oxide, IV fentanyl and epidural. Additionally discussed option for pitocin " augmentation. Patient desires epidural so that she can sleep. Will consider pitocin after she is comfortable with epidural. Alerted Anesthesia.   Promote rest  Encourage restful forward-leaning positions to facilitate optimal fetal position for OP baby    I, LESLI Puga am serving as a scribe; to document services personally performed by  AAMIR Angeles CNM based on data collection and the provider's statements to me.     LESLI Puga APRN CNM

## 2023-05-09 NOTE — PROGRESS NOTES
"S: Masha is requesting cervical exam. Contractions are increasing in intensity. Masha is tearful and vocalizing through contractions, coping well, responding positively to the voices of support around her. Ambulating and using hydrotherapy for comfort measures,     Support: Fatou providing continuous support at bedside.    O:   Blood pressure 117/65, pulse 99, temperature 98.8  F (37.1  C), temperature source Oral, resp. rate 18, height 1.727 m (5' 8\"), weight 91.9 kg (202 lb 11.2 oz), last menstrual period 2022, not currently breastfeeding.    IA: (pt meets criteria and desires IA monitoring):  Baseline rate: 140  Regular heart rate rhythm  Increases in fetal heart rate Not Present  Decreases in fetal heart rate Not Present  Interventions performed: No interventions needed at this time   Uterine activity: Contraction frequency 2-3mins, length 40-60 sec, strong  FHTs auscultated by nurse while provider at bedside      ROM: ruptured since 0100 with clear fluid  PELVIC EXAM:PELVIC EXAM: 6/ 100%/-1      ASSESSMENT:  ==============  Masha Spence  38 year old  female  Estimated Date of Delivery: 2023  IUP @ 41w3d active labor   FHR Category I by IA    Uterine Activity: normal uterine activity   GBS- negative    Patient Active Problem List   Diagnosis     Acute bilateral low back pain without sciatica     AMA (advanced maternal age) multigravida 35+     Labor and delivery indication for care or intervention       PLAN:  ===========  Comfort measures PRN  Can set up water birth tub  Anticipate    Reassess in 2-4 hours or sooner PRShana ROSE SNM am serving as a scribe; to document services personally performed by  AAMIR Angeles CNM based on data collection and the provider's statements to me.     LESLI Puga APRN CNM   "

## 2023-05-10 LAB — HGB BLD-MCNC: 10.7 G/DL (ref 11.7–15.7)

## 2023-05-10 PROCEDURE — 120N000002 HC R&B MED SURG/OB UMMC

## 2023-05-10 PROCEDURE — 00HU33Z INSERTION OF INFUSION DEVICE INTO SPINAL CANAL, PERCUTANEOUS APPROACH: ICD-10-PCS | Performed by: STUDENT IN AN ORGANIZED HEALTH CARE EDUCATION/TRAINING PROGRAM

## 2023-05-10 PROCEDURE — G0463 HOSPITAL OUTPT CLINIC VISIT: HCPCS

## 2023-05-10 PROCEDURE — 3E0R3BZ INTRODUCTION OF ANESTHETIC AGENT INTO SPINAL CANAL, PERCUTANEOUS APPROACH: ICD-10-PCS | Performed by: STUDENT IN AN ORGANIZED HEALTH CARE EDUCATION/TRAINING PROGRAM

## 2023-05-10 PROCEDURE — 85018 HEMOGLOBIN: CPT | Performed by: ADVANCED PRACTICE MIDWIFE

## 2023-05-10 PROCEDURE — 0KQM0ZZ REPAIR PERINEUM MUSCLE, OPEN APPROACH: ICD-10-PCS | Performed by: ADVANCED PRACTICE MIDWIFE

## 2023-05-10 PROCEDURE — 722N000001 HC LABOR CARE VAGINAL DELIVERY SINGLE

## 2023-05-10 PROCEDURE — 250N000009 HC RX 250

## 2023-05-10 PROCEDURE — 36415 COLL VENOUS BLD VENIPUNCTURE: CPT | Performed by: ADVANCED PRACTICE MIDWIFE

## 2023-05-10 PROCEDURE — 59400 OBSTETRICAL CARE: CPT | Performed by: ADVANCED PRACTICE MIDWIFE

## 2023-05-10 PROCEDURE — 250N000009 HC RX 250: Performed by: ADVANCED PRACTICE MIDWIFE

## 2023-05-10 PROCEDURE — 250N000013 HC RX MED GY IP 250 OP 250 PS 637: Performed by: ADVANCED PRACTICE MIDWIFE

## 2023-05-10 RX ORDER — OXYTOCIN/0.9 % SODIUM CHLORIDE 30/500 ML
340 PLASTIC BAG, INJECTION (ML) INTRAVENOUS CONTINUOUS PRN
Status: DISCONTINUED | OUTPATIENT
Start: 2023-05-10 | End: 2023-05-11 | Stop reason: HOSPADM

## 2023-05-10 RX ORDER — BISACODYL 10 MG
10 SUPPOSITORY, RECTAL RECTAL DAILY PRN
Status: DISCONTINUED | OUTPATIENT
Start: 2023-05-10 | End: 2023-05-11 | Stop reason: HOSPADM

## 2023-05-10 RX ORDER — MISOPROSTOL 200 UG/1
400 TABLET ORAL
Status: DISCONTINUED | OUTPATIENT
Start: 2023-05-10 | End: 2023-05-11 | Stop reason: HOSPADM

## 2023-05-10 RX ORDER — DOCUSATE SODIUM 100 MG/1
100 CAPSULE, LIQUID FILLED ORAL DAILY
Status: DISCONTINUED | OUTPATIENT
Start: 2023-05-10 | End: 2023-05-11 | Stop reason: HOSPADM

## 2023-05-10 RX ORDER — HYDROCORTISONE 25 MG/G
CREAM TOPICAL 3 TIMES DAILY PRN
Status: DISCONTINUED | OUTPATIENT
Start: 2023-05-10 | End: 2023-05-11 | Stop reason: HOSPADM

## 2023-05-10 RX ORDER — MISOPROSTOL 200 UG/1
800 TABLET ORAL
Status: DISCONTINUED | OUTPATIENT
Start: 2023-05-10 | End: 2023-05-11 | Stop reason: HOSPADM

## 2023-05-10 RX ORDER — TRANEXAMIC ACID 10 MG/ML
1 INJECTION, SOLUTION INTRAVENOUS EVERY 30 MIN PRN
Status: DISCONTINUED | OUTPATIENT
Start: 2023-05-10 | End: 2023-05-11 | Stop reason: HOSPADM

## 2023-05-10 RX ORDER — ACETAMINOPHEN 325 MG/1
650 TABLET ORAL EVERY 4 HOURS PRN
Status: DISCONTINUED | OUTPATIENT
Start: 2023-05-10 | End: 2023-05-11 | Stop reason: HOSPADM

## 2023-05-10 RX ORDER — MODIFIED LANOLIN
OINTMENT (GRAM) TOPICAL
Status: DISCONTINUED | OUTPATIENT
Start: 2023-05-10 | End: 2023-05-11 | Stop reason: HOSPADM

## 2023-05-10 RX ORDER — OXYTOCIN 10 [USP'U]/ML
10 INJECTION, SOLUTION INTRAMUSCULAR; INTRAVENOUS
Status: DISCONTINUED | OUTPATIENT
Start: 2023-05-10 | End: 2023-05-11 | Stop reason: HOSPADM

## 2023-05-10 RX ORDER — IBUPROFEN 800 MG/1
800 TABLET, FILM COATED ORAL EVERY 6 HOURS PRN
Status: DISCONTINUED | OUTPATIENT
Start: 2023-05-10 | End: 2023-05-11 | Stop reason: HOSPADM

## 2023-05-10 RX ORDER — METHYLERGONOVINE MALEATE 0.2 MG/ML
200 INJECTION INTRAVENOUS
Status: DISCONTINUED | OUTPATIENT
Start: 2023-05-10 | End: 2023-05-11 | Stop reason: HOSPADM

## 2023-05-10 RX ORDER — CARBOPROST TROMETHAMINE 250 UG/ML
250 INJECTION, SOLUTION INTRAMUSCULAR
Status: DISCONTINUED | OUTPATIENT
Start: 2023-05-10 | End: 2023-05-11 | Stop reason: HOSPADM

## 2023-05-10 RX ADMIN — IBUPROFEN 800 MG: 800 TABLET, FILM COATED ORAL at 15:48

## 2023-05-10 RX ADMIN — DOCUSATE SODIUM 100 MG: 100 CAPSULE, LIQUID FILLED ORAL at 11:23

## 2023-05-10 RX ADMIN — Medication 340 ML/HR: at 02:25

## 2023-05-10 RX ADMIN — ACETAMINOPHEN 650 MG: 325 TABLET ORAL at 17:06

## 2023-05-10 RX ADMIN — LIDOCAINE HYDROCHLORIDE 30 ML: 10 INJECTION, SOLUTION EPIDURAL; INFILTRATION; INTRACAUDAL; PERINEURAL at 02:38

## 2023-05-10 ASSESSMENT — ACTIVITIES OF DAILY LIVING (ADL)
ADLS_ACUITY_SCORE: 18

## 2023-05-10 NOTE — ANESTHESIA PROCEDURE NOTES
"Epidural catheter Procedure Note    Pre-Procedure   Staff -        Anesthesiologist:  Bessy De Oliveira MD       Resident/Fellow: Janneth Melendrez MD       Performed By: resident       Location: OB       Pre-Anesthestic Checklist: patient identified, IV checked, risks and benefits discussed, informed consent, monitors and equipment checked, pre-op evaluation, at physician/surgeon's request and post-op pain management  Timeout:       Correct Patient: Yes        Correct Procedure: Yes        Correct Site: Yes        Correct Position: Yes   Procedure Documentation  Procedure: epidural catheter       Patient Position: sitting       Patient Prep/Sterile Barriers: sterile gloves, mask, patient draped       Skin prep: Chloraprep       Local skin infiltrated with 3 mL of 1% lidocaine.        Insertion Site: L3-4. (midline approach).       Technique: LORT saline        VALERY at 3 cm.       Needle Type: Exhibia needle       Needle Gauge: 17.        Needle Length (Inches): 3.5        Catheter: 19 G.          Catheter threaded easily.         5 cm epidural space.         Threaded 8 cm at skin.         # of attempts:  # of redirects:  0    Assessment/Narrative         Paresthesias: No.       Test dose of 3 mL lidocaine 1.5% w/ 1:200,000 epinephrine at 18:38 CDT.        .       Insertion/Infusion Method: LORT saline       Aspiration negative for Heme or CSF via Epidural Catheter.       Sensory Level Left: T8.       Sensory Level Right: T8.    Medication(s) Administered   0.125% bupivacaine 5 mL + fentanyl 20 mcg + NS 5 mL (Epidural) (Mixture components: bupivacaine HCl (PF) 0.25 % Soln, 5 mL; fentaNYL (PF) 100 MCG/2ML Soln, 20 mcg; sodium chloride 0.9 % Soln, 5 mL) - EPIDURAL   5 mL - 5/9/2023 6:43:00 PM   5 mL - 5/9/2023 6:46:00 PM    FOR [facility] ([facility]) ONLY:   Pain Team Contact information: please page the Pain Team Via [employer]. Search \"Pain\". During daytime hours, please page the attending first. At night please " page the resident first.

## 2023-05-10 NOTE — PLAN OF CARE
Vaginal Delivery Note   of viable Female with Alejandra Krishna CNM in attendance. Nursery RN  present.  Infant with spontaneous cry, to mother's abdomen, dried and stimulated.  APGARs:  8/9.  Placenta delivered with out complication, 2nd degree laceration with repair, franco cares provided.  Mother and baby in stable condition.

## 2023-05-10 NOTE — PLAN OF CARE
Data: Vital signs within normal limits. Postpartum checks within normal limits - see flow record. Patient able to empty bladder independently and is up ambulating. Patient performing self cares and is able to care for infant.    Action:  Patient offered pain medication but declined and stated she was comfortable. Patient education done on room orientation. See flow record.    Response: Positive attachment behaviors observed with infant. Significant other present.     Plan: Continue to follow care plan.

## 2023-05-10 NOTE — PLAN OF CARE
Data: Contractions palpate strong. Fetal assessment category one.  Leaking small amounts of amniotic fluid.  Support person Virgilio and Diaz Azevedo present.  Interventions: Continue uterine/fetal assessment continuously. Vital Signs per order set. Comfort measures include support persons and breathing through contractions. Medicated with epidural.  Plan: Anticipate . Provide labor/coping assistance as needed by patient and support person.  Observe for and notify care provider of indications of progressing labor, need for pain medications, or signs of fetal/maternal compromise.

## 2023-05-10 NOTE — PROGRESS NOTES
Patient arrived to [facility] unit via wheelchair at 0444,with belongings, accompanied by spouse, with infant in arms. Received report from Stephanie SILVA RN and checked bands. Unit and room orientation completd. Call light given; no concerns present at this time. Continue with plan of care.

## 2023-05-10 NOTE — PLAN OF CARE
Data: Masha Spence transferred to 7130 via wheelchair at 0440. Baby transferred via parent's arms.  Action: Receiving unit notified of transfer: Yes. Patient and family notified of room change. Report given to Masha at 0404. Belongings sent to receiving unit. Accompanied by Registered Nurse. Oriented patient to surroundings. Call light within reach. ID bands double-checked with receiving RN.  Response: Patient tolerated transfer. Once at [facility] baby spit out some fluids. NICU was called 0449. NICU assessed in nursery and deep suctioned. Baby in stable condition and report was given to [facility] RN.

## 2023-05-10 NOTE — PROGRESS NOTES
"S: Masha is doing well. Got a good rest in. Feels increased pressure. Ready to move on to the next step. We have been discussing pitocin. She initially wanted to see how things went. She is making slow but continuous progress. Cervix 10/100/0 now. Discussed spaced contractions and pitocin may be beneficial but is it reasonable to try and continue to follow her birth preference and give pushing a try before starting pitocin. Discussed it does work pretty quickly so we can add it in if pushing is slow. She does not really feel her contractions so worried about pushing. Feels good about the plan of giving pushing a try and adding in pitocin if minimal progress. No other questions or concerns at this time.     O:  Blood pressure 106/56, pulse 99, temperature 98.7  F (37.1  C), temperature source Oral, resp. rate 16, height 1.727 m (5' 8\"), weight 91.9 kg (202 lb 11.2 oz), last menstrual period 2022, SpO2 93 %, not currently breastfeeding.  General appearance: comfortable with epidural     CONTACTIONS: Contractions every 6-8 minutes.  Palpate: moderate  FETAL HEART TONES: baseline 135 with moderate FHR variability and    accelerations yes. Decelerations no.    NST: REACTIVE  ROM: clear fluid  PELVIC EXAM: PELVIC EXAM: 10/ 100%/ 0   Fetal Position: cephalic   Bloody show: Yes   Pitocin- none,  Antibiotics- none  Cervical ripening: N/A    ASSESSMENT:  ==============  IUP @ 41w3d second stage labor   Fetal Heart rate tracing category one  GBS- negative     PLAN:  ===========  Will begin pushing  Anticipate      AAMIR Hargrove CNM      "

## 2023-05-10 NOTE — L&D DELIVERY NOTE
OB Vaginal Delivery Note  Masha arrived in early labor with SROM. She continued to make slow progress throughout the day. She opted for an epidural to get some rest. After resting she was 10/100/0. She began pushing with good effort but contractions were spaced out. Discussed pitocin augmentation which she agreed too. She continued to push with good effort and good contraction pattern. She made minimal change over 3 hours of pushing. MD on call notified of patient status and request made to assess fetal positioning. MD was able to change the flexion of the head, was over flexed in OA position. After that she made continuous progress with pushing. She pushed well to delivery of baby girl Radha. Radha was placed on her abdomen with spontaneous respirations. Cord was clamped and cut by RAMSEY Poole. Placenta delivered intact with three vessel cord. Perineum inspected and small 2nd degree noted and repaired in normal fashion with 3-0 vicryl. Mother and baby stable and bonding.   LESLI Pichardo present during the delivery.   I have reviewed and verified the documentation as completed by the CNM student of the procedure which I personally performed with the participation of the CNM student. AAMIR Hargrove CNM        Masha Spence MRN# [id number]   Age: 38 year old YOB: 1985       GA: 41w4d  GP:   Labor Complications: None   EBL:   mL  Delivery QBL: 13 mL  Delivery Type: Vaginal, Spontaneous   ROM to Delivery Time: (Delivered) Days: 1 Hours: 1 Minutes: 20  [place] Weight:     1 Minute 5 Minute 10 Minute   Apgar Totals: 8   9        PEREZ HELLER;MEGHANN STOREY;PARAS METCALF     Delivery Details:  Masha Spence, a 38 year old  female delivered a viable infant with apgars of 8  and 9 . Patient was fully dilated and pushing after 10  hours 54  minutes in active labor. Delivery was via vaginal, spontaneous  to a sterile field under epidural  anesthesia. Infant delivered in vertex  left   occiput  anterior  position. Anterior and posterior shoulders delivered without difficulty. The cord was clamped, cut twice and 3 vessels  were noted. Cord blood was obtained in routine fashion with the following disposition: lab .      Cord complications: none   Placenta delivered at 5/10/2023  2:27 AM . Placental disposition was Hospital disposal . Fundal massage performed and fundus found to be firm.     Episiotomy: none    Perineum, vagina, cervix were inspected, and the following lacerations were noted:   Perineal lacerations: 2nd                Any lacerations were repaired in the usual fashion using 3-0 vicryl.    Excellent hemostasis was noted. Needle count correct. Infant and patient in delivery room in good and stable condition.        Jordy Tobi-Masha [[id number]]    Labor Event Times    Latent labor onset date/time: 2023 0251    Active labor onset date: 23 Onset time: 10:06 AM CDT   Dilation complete date: 23 Complete time:  9:00 PM   Start pushing date/time: 2023 2200      Labor Length    1st Stage (hrs): 10 (min): 54   2nd Stage (hrs): 5 (min): 20   3rd Stage (hrs): 0 (min): 7      Labor Events     labor?: No   steroids: None  Labor Type: Spontaneous  Predominate monitoring during 1st stage: intermittent auscultation     Antibiotics received during labor?: No     Rupture identifier: Sac 1  Rupture date/time: 23 0100   Rupture type: Spontaneous Rupture of Membranes  Fluid color: Clear  Fluid odor: Normal     Augmentation: Oxytocin  Indications for augmentation: Ineffective Contraction Pattern     Delivery/Placenta Date and Time    Delivery Date: 5/10/23 Delivery Time:  2:20 AM   Placenta Date/Time: 5/10/2023  2:27 AM  Oxytocin given at the time of delivery: after delivery of placenta  Delivering clinician: Alejandra Krishna APRN CNM   Other personnel present at delivery:  Provider Role   Stephanie Mitchell, RN Delivery Nurse   Jeremy Sebastian RN Registered Nurse    Marti Metcalf, RN Registered Nurse         Vaginal Counts     Initial count performed by 2 team members:  Two Team Members   Stephanie MACIAS       Needles Suture Needles Sponges (RETIRED) Instruments   Initial counts 2 0 5    Added to count  1     Relief counts       Final counts 2 1 5          Placed during labor Accounted for at the end of labor   FSE No NA   IUPC No NA   Cervidil No NA              Final count performed by 2 team members:  Two Team Members   Stephanie MACIAS      Final count correct?: Yes  Pre-Birth Team Brief: Complete  Post-Birth Team Debrief: Complete     Apgars    Living status: Living   1 Minute 5 Minute 10 Minute 15 Minute 20 Minute   Skin color: 0  1       Heart rate: 2  2       Reflex irritability: 2  2       Muscle tone: 2  2       Respiratory effort: 2  2       Total: 8  9       Apgars assigned by: MARTI METCALF RN     Cord    Vessels: 3 Vessels    Cord Complications: None   Cord Blood Disposition: Lab      Gases Sent?: No      Delayed cord clamping?: Yes      Cord Clamping Delay (seconds):  seconds      Stem cell collection?: No                      Resuscitation    [place] Care at Delivery: Vaginal delivery of viable baby girl. Baby placed on mom's abd with spontaneous cry noted. Delayed cord clamping. Stimulated and dried with warm blankets applied. No other interventions needed.       Skin to Skin and Feeding Plan    Skin to skin initiation date/time:     Skin to skin with: Mother  Skin to skin end date/time:        Labor Events and Shoulder Dystocia    Fetal Tracing Prior to Delivery: Category 1  Shoulder dystocia present?: Neg     Delivery (Maternal) (Provider to Complete) ([id number])    Episiotomy: None  Perineal lacerations: 2nd Repaired?: Yes   Repair suture: 3-0 Vicryl  Number of repair packets: 1  Genital tract inspection done: Pos     Blood Loss  Mother: Jordy Masha PABLO #[id number]   Start of Mother's Information    Delivery Blood Loss  23 1006 -  05/10/23 0332    Delivery QBL (mL) Hospital Encounter 163 mL    Total  163 mL         End of Mother's Information  Mother: Masha Spence #[id number]          Delivery - Provider to Complete ([id number])    Delivering clinician: Alejandra Krishna APRN CNM  Delivery Type (Choose the 1 that will go to the Birth History): Vaginal, Spontaneous    Other personnel:  Provider Role   Stephanie Mitchell, RN Delivery Nurse   Jeremy Sebastian RN Registered Nurse   Marti Osuna RN Registered Nurse                               Placenta    Date/Time: 5/10/2023  2:27 AM  Removal: Spontaneous  Disposition: Hospital disposal           Anesthesia    Method: Epidural  Cervical dilation at placement: 8-10                Presentation and Position    Presentation: Vertex    Position: Left Occiput Anterior                 AAMIR Hargrove CNM

## 2023-05-10 NOTE — PLAN OF CARE
Patient's postpartum assessments WDL, vital signs stable. Fundus firm and midline, lochia WDL.  SL removed per patient request. HGB this morning was 10.7.   Voiding without difficulty.   Bonding well with infant.   Breastfeeds infant on cue, arousal required. Hand-expression encouraged.   Taking ibuprofen and tylenol PRN for uterine cramping pain, also using hot packs.   EDS completed and patient scored 5 (0 on last question about self harm).   Will continue to monitor and provide support.

## 2023-05-11 VITALS
OXYGEN SATURATION: 97 % | TEMPERATURE: 97.8 F | DIASTOLIC BLOOD PRESSURE: 71 MMHG | HEART RATE: 75 BPM | RESPIRATION RATE: 16 BRPM | BODY MASS INDEX: 29.08 KG/M2 | HEIGHT: 68 IN | SYSTOLIC BLOOD PRESSURE: 101 MMHG | WEIGHT: 191.9 LBS

## 2023-05-11 PROCEDURE — 250N000013 HC RX MED GY IP 250 OP 250 PS 637: Performed by: ADVANCED PRACTICE MIDWIFE

## 2023-05-11 RX ORDER — AMOXICILLIN 250 MG
1 CAPSULE ORAL DAILY
Qty: 100 TABLET | Refills: 0 | COMMUNITY
Start: 2023-05-11 | End: 2023-06-22

## 2023-05-11 RX ORDER — IBUPROFEN 600 MG/1
600 TABLET, FILM COATED ORAL EVERY 6 HOURS PRN
Qty: 60 TABLET | Refills: 0 | COMMUNITY
Start: 2023-05-11 | End: 2023-06-22

## 2023-05-11 RX ORDER — ACETAMINOPHEN 325 MG/1
650 TABLET ORAL EVERY 6 HOURS PRN
Qty: 100 TABLET | Refills: 0 | COMMUNITY
Start: 2023-05-11 | End: 2023-06-22

## 2023-05-11 RX ADMIN — CALCIUM CARBONATE (ANTACID) CHEW TAB 500 MG 500 MG: 500 CHEW TAB at 03:11

## 2023-05-11 RX ADMIN — IBUPROFEN 800 MG: 800 TABLET, FILM COATED ORAL at 03:11

## 2023-05-11 RX ADMIN — DOCUSATE SODIUM 100 MG: 100 CAPSULE, LIQUID FILLED ORAL at 07:44

## 2023-05-11 RX ADMIN — IBUPROFEN 800 MG: 800 TABLET, FILM COATED ORAL at 09:40

## 2023-05-11 ASSESSMENT — ACTIVITIES OF DAILY LIVING (ADL)
ADLS_ACUITY_SCORE: 18

## 2023-05-11 NOTE — DISCHARGE SUMMARY
"Post Partum Note    Masha Spence      MRN#: [id number]  Age: 38 year old      YOB: 1985      Post-partum day #1    Masha reports that her recovery is going well, no concerns. Moderate cramping with breastfeeding but minimal in between. Normal bleeding. Eating, drinking and ambulating. Had a bowel movement this morning. Following baby's bilirubin but ready to discharge today.     SIGNIFICANT PROBLEMS:  Patient Active Problem List    Diagnosis Date Noted     Labor and delivery indication for care or intervention 2023     Priority: Medium     AMA (advanced maternal age) multigravida 35+ 10/14/2022     Priority: Medium     Acute bilateral low back pain without sciatica 2022     Priority: Medium       INTERVAL HISTORY:  /71 (BP Location: Left arm, Patient Position: Semi-Dickson's, Cuff Size: Adult Regular)   Pulse 75   Temp 97.8  F (36.6  C) (Oral)   Resp 16   Ht 1.727 m (5' 8\")   Wt 91.9 kg (202 lb 11.2 oz)   LMP 2022   SpO2 97%   Breastfeeding Unknown   BMI 30.82 kg/m      Pt stable, baby is rooming in  Breast feeding status:initiated  Complications since 2 hours post delivery: None  Patient is tolerating acitivity well, voiding without difficulty, cramping is minimal and is relieved by Ibuprophen, lochia is decreasing and patient denies clots.  Perineal pain is is minimal and is relieved by Ibuprophen.  The perineum laceration is well approximated    Normal postpartum exam     Postpartum hemoglobin   Hemoglobin   Date Value Ref Range Status   05/10/2023 10.7 (L) 11.7 - 15.7 g/dL Final   2021 13.2 11.7 - 15.7 g/dL Final     Blood type O POS  Rubella immune    ASSESSMENT/PLAN:  Stable Post-partum day #1  Complications:none  Plan d/c home today  RTC 2 weeks for telephone visit and 6 weeks for in person postpartum visit  Teaching done: Birth Control Options, Warning Signs/When to Call: Excessive Bleeding, Infection, PP Depression, RTC Clinic for PP Appointment " and PNV    Postpartum warning s/s reviewed, including bleeding/clots, fever, mastitis, or depression    Birthcontrol planned:Condoms  Current Discharge Medication List      START taking these medications    Details   acetaminophen (TYLENOL) 325 MG tablet Take 2 tablets (650 mg) by mouth every 6 hours as needed for mild pain Start after Delivery.  Qty: 100 tablet, Refills: 0    Associated Diagnoses:  (normal spontaneous vaginal delivery)      ibuprofen (ADVIL/MOTRIN) 600 MG tablet Take 1 tablet (600 mg) by mouth every 6 hours as needed for moderate pain Start after delivery  Qty: 60 tablet, Refills: 0    Associated Diagnoses:  (normal spontaneous vaginal delivery)      senna-docusate (SENOKOT-S/PERICOLACE) 8.6-50 MG tablet Take 1 tablet by mouth daily Start after delivery.  Qty: 100 tablet, Refills: 0    Associated Diagnoses:  (normal spontaneous vaginal delivery)         CONTINUE these medications which have NOT CHANGED    Details   Prenatal Vit-Fe Fumarate-FA (PRENATAL VITAMIN PO)          STOP taking these medications       Ascorbic Acid (VITAMIN C PO) Comments:   Reason for Stopping:         Magnesium Oxide POWD Comments:   Reason for Stopping:           Ellie Allen CNM

## 2023-05-11 NOTE — PROGRESS NOTES
"  Anesthesia Post-Partum Follow-Up Note After Vaginal Delivery with Epidural    Patient: Masha Spence    Patient location: Post-partum floor    Anesthesia type: Epidural    Subjective  Masha Spence does not complain of pruritis at this time. She denies weakness, denies paresthesia, denies difficulties breathing or voiding, denies nausea or vomiting, and denies headache. She is able to ambulate and tolerates regular diet. Patient endorsed an overall positive anesthesia experience.    Objective  Respiratory Function (RR / SpO2 / Airway Patency): Satisfactory  Cardiac Function (HR / Rhythm / BP): Satisfactory  Strength and sensation lower extremities: Normal  Site of epidural insertion: No signs of infection or inflammation    Most recent vitals  /71 (BP Location: Left arm, Patient Position: Semi-Dickson's, Cuff Size: Adult Regular)   Pulse 75   Temp 36.6  C (97.8  F) (Oral)   Resp 16   Ht 1.727 m (5' 8\")   Wt 91.9 kg (202 lb 11.2 oz)   LMP 2022   SpO2 97%   Breastfeeding Unknown   BMI 30.82 kg/m      Assessment and plan  Masha Spence is a 38 year old female  post-partum #1 s/p vaginal delivery. An epidural catheter was successfully inserted and provided labor analgesia via programmed intermittent bolus pump infusing 0.1% ropivacaine and 2 mcg/ml fentanyl, in addition to patient-controlled epidural analgesia and physician hand boluses as needed. The patient delivered via  and the epidural catheter was removed immediately thereafter by the L&D RN.     At this time, there is no evidence of adverse side effects associated with the insertion or removal of the epidural catheter. If the patient develops new lower extremity paresis or paresthesias, or if there are concerns regarding the insertion site of the catheter, please reach out to the anesthesia department OB division [phone]).    Thank you for including us in the care for this patient. Anesthesia is signing off at this time.     Justice " MD Jamie  Anesthesiology Resident, CA-2  May 11, 2023

## 2023-05-11 NOTE — DISCHARGE INSTRUCTIONS
Postpartum Vaginal Delivery Instructions    Activity     Ask family and friends for help when you need it.  Do not place anything in your vagina for 6 weeks.  You are not restricted on other activities, but take it easy for a few weeks to allow your body to recover from delivery.  You are able to do any activities you feel up to that point.  No driving until you have stopped taking your pain medications (usually two weeks after delivery).     Call your health care provider if you have any of these symptoms:     Increased pain, swelling, redness, or fluid around your stiches from an episiotomy or perineal tear.  A fever above 100.4 F (38 C) with or without chills when placing a thermometer under your tongue.  You soak a sanitary pad with blood within 1 hour, or you see blood clots larger than a golf ball.  Bleeding that lasts more than 6 weeks.  Vaginal discharge that smells bad.  Severe pain, cramping or tenderness in your lower belly area.  A need to urinate more frequently (use the toilet more often), more urgently (use the toilet very quickly), or it burns when you urinate.  Nausea and vomiting.  Redness, swelling or pain around a vein in your leg.  Problems breastfeeding or a red or painful area on your breast.  Chest pain and cough or are gasping for air.  Problems coping with sadness, anxiety, or depression.  If you have any concerns about hurting yourself or the baby, call your provider immediately.   You have questions or concerns after you return home.     Keep your hands clean:  Always wash your hands before touching your perineal area and stitches.  This helps reduce your risk of infection.  If your hands aren't dirty, you may use an alcohol hand-rub to clean your hands. Keep your nails clean and short.        Warning Signs after Having a Baby    Keep this paper on your fridge or somewhere else where you can see it.    Call your provider if you have any of these symptoms up to 12 weeks after having your  baby.    Thoughts of hurting yourself or your baby  Pain in your chest or trouble breathing  Severe headache not helped by pain medicine  Eyesight concerns (blurry vision, seeing spots or flashes of light, other changes to eyesight)  Fainting, shaking or other signs of a seizure    Call 9-1-1 if you feel that it is an emergency.     The symptoms below can happen to anyone after giving birth. They can be very serious. Call your provider if you have any of these warning signs.    My provider s phone number: _______________________    Losing too much blood (hemorrhage)    Call your provider if you soak through a pad in less than an hour or pass blood clots bigger than a golf ball. These may be signs that you are bleeding too much.    Blood clots in the legs or lungs    After you give birth, your body naturally clots its blood to help prevent blood loss. Sometimes this increased clotting can happen in other areas of the body, like the legs or lungs. This can block your blood flow and be very dangerous.     Call your provider if you:  Have a red, swollen spot on the back of your leg that is warm or painful when you touch it.   Are coughing up blood.     Infection    Call your provider if you have any of these symptoms:  Fever of 100.4 F (38 C) or higher.  Pain or redness around your stitches if you had an incision.   Any yellow, white, or green fluid coming from places where you had stitches or surgery.    Mood Problems (postpartum depression)    Many people feel sad or have mood changes after having a baby. But for some people, these mood swings are worse.     Call your provider right away if you feel so anxious or nervous that you can't care for yourself or your baby.    Preeclampsia (high blood pressure)    Even if you didn't have high blood pressure when you were pregnant, you are at risk for the high blood pressure disease called preeclampsia. This risk can last up to 12 weeks after giving birth.     Call your  provider if you have:   Pain on your right side under your rib cage  Sudden swelling in the hands and face    Remember: You know your body. If something doesn't feel right, get medical help.     For informational purposes only. Not to replace the advice of your health care provider. Copyright 2020 [employer]. All rights reserved. Clinically reviewed by Jenna Hall, RNC-OB, MSN. [employer]izza [id number] - Rev 02/23.

## 2023-05-11 NOTE — PLAN OF CARE
Patient discharged. All education reviewed, questions addressed, medication reviewed and verified. EDS 5, BC completed, Videos reviewed. Will get a breast pump elsewhere.

## 2023-05-11 NOTE — PLAN OF CARE
Data: Vital signs within normal limits. Postpartum checks within normal limits - see flow record. Patient eating and drinking normally. Patient able to empty bladder independently and is up ambulating. No apparent signs of infection. healing well. Patient performing self cares and is able to care for infant.  Action: Patient medicated during the shift for pain and cramping. See MAR. Patient reassessed within 1 hour after each medication and pain was improved - patient stated she was comfortable. Patient education done about plan of care. See flow record.  Response: Positive attachment behaviors observed with infant. Support person, , Virgilio, present.   Plan: Anticipate discharge today.

## 2023-05-11 NOTE — PLAN OF CARE
Goal Outcome Evaluation:     Vital signs and postpartum assessments within normal limits. Fundus is midline, firm, and 1 cm below umbilicus. Lochia is scant. Up ad chucky, voiding spontaneously without difficulty. Pain is adequately managed with tylenol and ibuprofen. Breastfeeding on cue every 2-3 hours with good latch. Spouse at bedside, both are bonding well with . Continue  with education and plan of care.

## 2023-05-22 ENCOUNTER — TELEPHONE (OUTPATIENT)
Dept: MIDWIFE SERVICES | Facility: CLINIC | Age: 38
End: 2023-05-22
Payer: COMMERCIAL

## 2023-06-22 ENCOUNTER — PRENATAL OFFICE VISIT (OUTPATIENT)
Dept: MIDWIFE SERVICES | Facility: CLINIC | Age: 38
End: 2023-06-22
Payer: COMMERCIAL

## 2023-06-22 VITALS
OXYGEN SATURATION: 99 % | BODY MASS INDEX: 27.74 KG/M2 | DIASTOLIC BLOOD PRESSURE: 74 MMHG | WEIGHT: 183 LBS | SYSTOLIC BLOOD PRESSURE: 107 MMHG | HEIGHT: 68 IN | HEART RATE: 80 BPM

## 2023-06-22 DIAGNOSIS — N81.89 PELVIC FLOOR WEAKNESS: ICD-10-CM

## 2023-06-22 PROBLEM — O09.529 AMA (ADVANCED MATERNAL AGE) MULTIGRAVIDA 35+: Status: RESOLVED | Noted: 2022-10-14 | Resolved: 2023-06-22

## 2023-06-22 PROCEDURE — 99207 PR POST PARTUM EXAM: CPT | Performed by: ADVANCED PRACTICE MIDWIFE

## 2023-06-22 ASSESSMENT — ANXIETY QUESTIONNAIRES
6. BECOMING EASILY ANNOYED OR IRRITABLE: SEVERAL DAYS
1. FEELING NERVOUS, ANXIOUS, OR ON EDGE: NOT AT ALL
5. BEING SO RESTLESS THAT IT IS HARD TO SIT STILL: NOT AT ALL
7. FEELING AFRAID AS IF SOMETHING AWFUL MIGHT HAPPEN: NOT AT ALL
3. WORRYING TOO MUCH ABOUT DIFFERENT THINGS: NOT AT ALL
GAD7 TOTAL SCORE: 1
GAD7 TOTAL SCORE: 1
2. NOT BEING ABLE TO STOP OR CONTROL WORRYING: NOT AT ALL

## 2023-06-22 ASSESSMENT — PATIENT HEALTH QUESTIONNAIRE - PHQ9
5. POOR APPETITE OR OVEREATING: NOT AT ALL
SUM OF ALL RESPONSES TO PHQ QUESTIONS 1-9: 2

## 2023-06-22 NOTE — PROGRESS NOTES
"SUBJECTIVE:   Masha Spence is here for her 6-week postpartum checkup.     HPI: Doing well adjusting to having 2 kids. Radha is growing well and healthy. She is breastfeeding, no concerns. She is not having any bleeding or pain. She feels like the stitches have healed well. She has no concerns about her mood, she has good support at home. She is planning to use condoms for birth control. Would like to start running, okay to do that. She is interested in pelvic floor physical therapy, referral given today. She has a lump on her labia, would like that look at. Pap smear is up to date. Discussed routine annual visits and cholesterol and mammogram testing with breastfeeding. No other questions or concerns today.     DELIVERY DATE: 5/10/2023   of a viable girl, weight 8 pounds 10 oz., with no complications.  FEEDING METHOD:    CONTRACEPTION PLANNED: condoms  She  has not had intercourse since delivery and complains of No discomfort.  HX OF DEPRESSION:No  HX OF ABUSE:No  OTHER HPI: She has no signs of infection, bleeding or other complications. Bleeding stopped, epis/lac healing well.         EXAM:  /74   Pulse 80   Ht 1.727 m (5' 8\")   Wt 83 kg (183 lb)   LMP 2022   SpO2 99%   Breastfeeding Yes   BMI 27.83 kg/m    GENERAL APPEARANCE: healthy, alert and no distress  BREAST: soft, nontender, nipples intact, lactating   ABDOMEN: soft, nontender, diastasis recti about 2 cm  PSYCH: mentation appears normal and affect normal/bright  PELVIC EXAM:  Vulva: BUS WNL, no lesions noted, 1 cm x 1 cm white firm, oval shaped, mildly tender with palpation. Well healed laceration   Vagina: Discharge normal and physiologic, no lesions, well rugated, good tone      ASSESSMENT:   Normal postpartum exam after .    PLAN:  Birth Control as ordered. Fertility reviewed.    Return as needed or at time interval for next routine pap, pelvic, or breast exam.  Encourage Kegals and abdominal exercise. Slow, steady " weight loss.  Continue a multi vitamin supplement, especially if breastfeeding.  Pap smear was obtained. Discussed when due, 2026.   GC/CHLAMYDIA CULTURE OBTAINED:PATIENT DECLINED  Post partum Hgb was not obtained.  Follow up PRN. Cyst appears to be more of a fatty deposit cyst, she did have that near her shoulder that was removed. Discussed removal with derm or OB/GYN.     AAMIR Hargrove CNM

## 2023-07-28 ENCOUNTER — THERAPY VISIT (OUTPATIENT)
Dept: PHYSICAL THERAPY | Facility: CLINIC | Age: 38
End: 2023-07-28
Payer: COMMERCIAL

## 2023-07-28 DIAGNOSIS — M62.08 DIASTASIS RECTI: Primary | ICD-10-CM

## 2023-07-28 PROCEDURE — 97161 PT EVAL LOW COMPLEX 20 MIN: CPT | Mod: GP | Performed by: PHYSICAL THERAPIST

## 2023-07-28 PROCEDURE — 97110 THERAPEUTIC EXERCISES: CPT | Mod: GP | Performed by: PHYSICAL THERAPIST

## 2023-07-28 NOTE — PROGRESS NOTES
PHYSICAL THERAPY EVALUATION  Type of Visit: Evaluation    See electronic medical record for Abuse and Falls Screening details.    Subjective       Presenting condition or subjective complaint:  said I had diastasis recti  Date of onset: 05/06/23    Relevant medical history: Bladder or bowel problems; Currently pregnant or breastfeeding; Migraines or headaches     Prior therapy history for the same diagnosis, illness or injury: No      Living Environment  Social support: With family members   Type of home: House   Stairs to enter the home: Yes 4 Is there a railing: No   Ramp: No   Stairs inside the home: Yes 15 Is there a railing: Yes   Help at home: None  Equipment owned:       Employment: Yes   Hobbies/Interests: Walking and Reading       Objective      PELVIC EVALUATION  ADDITIONAL HISTORY:  Sex assigned at birth: Female  Gender identity: Female    Pronouns: She/Her Hers      Bladder History:  Feels bladder filling: No  Triggers for feeling of inability to wait to go to the bathroom: No    How long can you wait to urinate: I don't try to hold it  Gets up at night to urinate: Yes 1 time  Can stop the flow of urine when urinating: Yes  Volume of urine usually released: Medium   Other issues:    Number of bladder infections in last 12 months:    Fluid intake per day: several cups one cup of tea or sometimes coffee one, occasionally  Medications taken for bladder: No     Activities causing urine leak:      Amount of urine typically leaked:    Pads used to help with leaking: No        Bowel History:  Frequency of bowel movement: once or twice a day  Consistency of stool: Soft-formed    Ignores the urge to defecate: No  Other bowel issues:    Length of time spent trying to have a bowel movement:      Sexual Function History:  Sexual orientation: Straight    Sexually active: Yes  Lubrication used: No No  Pelvic pain:      Pain or difficulty with orgasms/erection/ejaculation: No    State of menopause:     Hormone medications: No      Are you currently pregnant: No, Number of previous pregnancies: 2, Number of deliveries: 2, If you have delivered before, did you have any of these issues during delivery: Tearing, Have you been diagnosed with pelvic prolapse or abdominal separation: Yes, Do you get regular exercise: Yes, I do this type of exercise: I walk 2-4 miles, Have you tried pelvic floor strengthening exercises for 4 weeks: No, Do you have any history of trauma that is relevant to your care that you d like to share: No    Discussed reason for referral regarding pelvic health needs and external/internal pelvic floor muscle examination with patient/guardian.  Opportunity provided to ask questions and verbal consent for assessment and intervention was given.    POSTURE: WNL  LUMBAR SCREEN: nil loss flexion, mod loss extension.  HIP SCREEN:  Strength: WNL   Functional Strength Testing: SLS: good control without trendelenburg.     PELVIC EXAM  External Visual Inspection:  At rest: Normal  With voluntary pelvic floor contraction: Perineal elevation  Relaxation of PFM: Yes    Integumentary:   Introitus: Unremarkable    Internal Digital Palpation:  Per Vagina:  Digital Muscle Performance: P (Power): 4/5  E (Endurance): able to hold 10s holds  F (Fast Twitch): pt able to perform 10 quick contractions.    ABDOMINAL ASSESSMENT  Diastasis Rectus Abdominis (ELAINE):  ELAINE presence: Yes   Above Umbilicus Depth/Finger width: 2-3cm   At Umbilicus Depth/Finger width: 2-3cm   Below Umbilicus Depth/Finger width: 2-3cm      Assessment & Plan   CLINICAL IMPRESSIONS  Medical Diagnosis: Diastasis Recti    Treatment Diagnosis: Diastasis Recti   Impression/Assessment: Patient is a 38 year old female with diastasis recti complaints.  The following significant findings have been identified: Decreased ROM/flexibility and Decreased strength. These impairments interfere with their ability to perform self care tasks, work tasks, recreational  activities, and household chores as compared to previous level of function.     Clinical Decision Making (Complexity):  Clinical Presentation: Stable/Uncomplicated  Clinical Presentation Rationale: based on medical and personal factors listed in PT evaluation  Clinical Decision Making (Complexity): Low complexity    PLAN OF CARE  Treatment Interventions:  Interventions: Neuromuscular Re-education, Therapeutic Activity, Therapeutic Exercise    Long Term Goals     PT Goal 1  Goal Identifier: Mobility  Goal Description: pt carey be able to perform supine to sitting, demo good core strength with no pain or abdominal separation  Rationale: to maximize safety and independence with performance of ADLs and functional tasks  Target Date: 09/22/23      Frequency of Treatment: 1x every other week  Duration of Treatment: 4 weeks    Recommended Referrals to Other Professionals:  none  Education Assessment:   Learner/Method: Patient;No Barriers to Learning;Listening    Risks and benefits of evaluation/treatment have been explained.   Patient/Family/caregiver agrees with Plan of Care.     Evaluation Time:     PT Eval, Low Complexity Minutes (83062): 20       Signing Clinician: Capri Plata PT

## 2023-08-05 ENCOUNTER — HEALTH MAINTENANCE LETTER (OUTPATIENT)
Age: 38
End: 2023-08-05

## 2023-08-09 ENCOUNTER — THERAPY VISIT (OUTPATIENT)
Dept: PHYSICAL THERAPY | Facility: CLINIC | Age: 38
End: 2023-08-09
Payer: COMMERCIAL

## 2023-08-09 DIAGNOSIS — M62.08 DIASTASIS RECTI: Primary | ICD-10-CM

## 2023-08-09 PROCEDURE — 97110 THERAPEUTIC EXERCISES: CPT | Mod: GP | Performed by: PHYSICAL THERAPIST

## 2023-08-19 ENCOUNTER — MYC MEDICAL ADVICE (OUTPATIENT)
Dept: MIDWIFE SERVICES | Facility: CLINIC | Age: 38
End: 2023-08-19
Payer: COMMERCIAL

## 2023-08-21 NOTE — TELEPHONE ENCOUNTER
Called patient to discuss. Not able to prescribe Paxlovid r/t low risk for severe illness. Masha states she is starting to feel better.     Adia Meza, ALEJANDRAM, AAMIR CNM

## 2023-08-21 NOTE — TELEPHONE ENCOUNTER
5/10/23  Last office visit 23 - 6 week PP visit     MyChart message from pt, tested positive for COVID on . Symptoms maybe started evening of , morning of --is on day 4-5 of symptoms today.      Can I advise her to start an e-visit or telephone visit with CNM on call today or should she be directed to family practice?    Pauline Hernandez RN

## 2023-09-08 ENCOUNTER — MEDICAL CORRESPONDENCE (OUTPATIENT)
Dept: HEALTH INFORMATION MANAGEMENT | Facility: CLINIC | Age: 38
End: 2023-09-08
Payer: COMMERCIAL

## 2023-10-19 ENCOUNTER — THERAPY VISIT (OUTPATIENT)
Dept: PHYSICAL THERAPY | Facility: CLINIC | Age: 38
End: 2023-10-19
Payer: COMMERCIAL

## 2023-10-19 DIAGNOSIS — M62.08 DIASTASIS RECTI: Primary | ICD-10-CM

## 2023-10-19 PROCEDURE — 97110 THERAPEUTIC EXERCISES: CPT | Mod: GP | Performed by: PHYSICAL THERAPIST

## 2023-11-10 ENCOUNTER — MEDICAL CORRESPONDENCE (OUTPATIENT)
Dept: HEALTH INFORMATION MANAGEMENT | Facility: CLINIC | Age: 38
End: 2023-11-10
Payer: COMMERCIAL

## 2024-03-02 ENCOUNTER — HEALTH MAINTENANCE LETTER (OUTPATIENT)
Age: 39
End: 2024-03-02

## 2024-05-07 SDOH — HEALTH STABILITY: PHYSICAL HEALTH: ON AVERAGE, HOW MANY MINUTES DO YOU ENGAGE IN EXERCISE AT THIS LEVEL?: 50 MIN

## 2024-05-07 SDOH — HEALTH STABILITY: PHYSICAL HEALTH: ON AVERAGE, HOW MANY DAYS PER WEEK DO YOU ENGAGE IN MODERATE TO STRENUOUS EXERCISE (LIKE A BRISK WALK)?: 7 DAYS

## 2024-05-07 ASSESSMENT — SOCIAL DETERMINANTS OF HEALTH (SDOH): HOW OFTEN DO YOU GET TOGETHER WITH FRIENDS OR RELATIVES?: ONCE A WEEK

## 2024-05-10 ENCOUNTER — OFFICE VISIT (OUTPATIENT)
Dept: FAMILY MEDICINE | Facility: CLINIC | Age: 39
End: 2024-05-10
Payer: COMMERCIAL

## 2024-05-10 VITALS
HEIGHT: 68 IN | TEMPERATURE: 98.3 F | RESPIRATION RATE: 14 BRPM | WEIGHT: 160.6 LBS | BODY MASS INDEX: 24.34 KG/M2 | DIASTOLIC BLOOD PRESSURE: 64 MMHG | OXYGEN SATURATION: 100 % | SYSTOLIC BLOOD PRESSURE: 110 MMHG | HEART RATE: 97 BPM

## 2024-05-10 DIAGNOSIS — Z00.00 ROUTINE GENERAL MEDICAL EXAMINATION AT A HEALTH CARE FACILITY: Primary | ICD-10-CM

## 2024-05-10 PROBLEM — M54.50 ACUTE BILATERAL LOW BACK PAIN WITHOUT SCIATICA: Status: RESOLVED | Noted: 2022-01-03 | Resolved: 2024-05-10

## 2024-05-10 LAB
ERYTHROCYTE [DISTWIDTH] IN BLOOD BY AUTOMATED COUNT: 13 % (ref 10–15)
HCT VFR BLD AUTO: 41.6 % (ref 35–47)
HGB BLD-MCNC: 13.4 G/DL (ref 11.7–15.7)
MCH RBC QN AUTO: 27.5 PG (ref 26.5–33)
MCHC RBC AUTO-ENTMCNC: 32.2 G/DL (ref 31.5–36.5)
MCV RBC AUTO: 85 FL (ref 78–100)
PLATELET # BLD AUTO: 236 10E3/UL (ref 150–450)
RBC # BLD AUTO: 4.87 10E6/UL (ref 3.8–5.2)
WBC # BLD AUTO: 5.8 10E3/UL (ref 4–11)

## 2024-05-10 PROCEDURE — 80048 BASIC METABOLIC PNL TOTAL CA: CPT | Performed by: PHYSICIAN ASSISTANT

## 2024-05-10 PROCEDURE — 85027 COMPLETE CBC AUTOMATED: CPT | Performed by: PHYSICIAN ASSISTANT

## 2024-05-10 PROCEDURE — 99386 PREV VISIT NEW AGE 40-64: CPT | Performed by: PHYSICIAN ASSISTANT

## 2024-05-10 PROCEDURE — 80061 LIPID PANEL: CPT | Performed by: PHYSICIAN ASSISTANT

## 2024-05-10 PROCEDURE — 36415 COLL VENOUS BLD VENIPUNCTURE: CPT | Performed by: PHYSICIAN ASSISTANT

## 2024-05-10 ASSESSMENT — PAIN SCALES - GENERAL: PAINLEVEL: NO PAIN (0)

## 2024-05-10 NOTE — PATIENT INSTRUCTIONS
"Preventive Care Advice   This is general advice we often give to help people stay healthy. Your care team may have specific advice just for you. Please talk to your care team about your own preventive care needs.  Lifestyle  Exercise at least 150 minutes each week (30 minutes a day, 5 days a week).  Do muscle strengthening activities 2 days a week. These help control your weight and prevent disease.  No smoking.  Wear sunscreen to prevent skin cancer.  Have your home tested for radon every 2 to 5 years. Radon is a colorless, odorless gas that can harm your lungs. To learn more, go to www.health.[facility].mn. and search for \"Radon in Homes.\"  Keep guns unloaded and locked up in a safe place like a safe or gun vault, or, use a gun lock and hide the keys. Always lock away bullets separately. To learn more, visit [employer].[place].gov and search for \"safe gun storage.\"  Nutrition  Eat 5 or more servings of fruits and vegetables each day.  Try wheat bread, brown rice and whole grain pasta (instead of white bread, rice, and pasta).  Get enough calcium and vitamin D. Check the label on foods and aim for 100% of the RDA (recommended daily allowance).  Regular exams  Have a dental exam and cleaning every 6 months.  See your health care team every year to talk about:  Any changes in your health.  Any medicines your care team has prescribed.  Preventive care, family planning, and ways to prevent chronic diseases.  Shots (vaccines)   HPV shots (up to age 26), if you've never had them before.  Hepatitis B shots (up to age 59), if you've never had them before.  COVID-19 shot: Get this shot when it's due.  Flu shot: Get a flu shot every year.  Tetanus shot: Get a tetanus shot every 10 years.  Pneumococcal, hepatitis A, and RSV shots: Ask your care team if you need these based on your risk.  Shingles shot (for age 50 and up).  General health tests  Diabetes screening:  Starting at age 35, Get screened for diabetes at least every 3 years.  If " you are younger than age 35, ask your care team if you should be screened for diabetes.  Cholesterol test: At age 39, start having a cholesterol test every 5 years, or more often if advised.  Bone density scan (DEXA): At age 50, ask your care team if you should have this scan for osteoporosis (brittle bones).  Hepatitis C: Get tested at least once in your life.  Abdominal aortic aneurysm screening: Talk to your doctor about having this screening if you:  Have ever smoked; and  Are biologically male; and  Are between the ages of 65 and 75.  STIs (sexually transmitted infections)  Before age 24: Ask your care team if you should be screened for STIs.  After age 24: Get screened for STIs if you're at risk. You are at risk for STIs (including HIV) if:  You are sexually active with more than one person.  You don't use condoms every time.  You or a partner was diagnosed with a sexually transmitted infection.  If you are at risk for HIV, ask about PrEP medicine to prevent HIV.  Get tested for HIV at least once in your life, whether you are at risk for HIV or not.  Cancer screening tests  Cervical cancer screening: If you have a cervix, begin getting regular cervical cancer screening tests at age 21. Most people who have regular screenings with normal results can stop after age 65. Talk about this with your provider.  Breast cancer scan (mammogram): If you've ever had breasts, begin having regular mammograms starting at age 40. This is a scan to check for breast cancer.  Colon cancer screening: It is important to start screening for colon cancer at age 45.  Have a colonoscopy test every 10 years (or more often if you're at risk) Or, ask your provider about stool tests like a FIT test every year or Cologuard test every 3 years.  To learn more about your testing options, visit: www.[employer]/743739.pdf.  For help making a decision, visit: james/jd48934.  Prostate cancer screening test: If you have a prostate and are age 55  to 69, ask your provider if you would benefit from a yearly prostate cancer screening test.  Lung cancer screening: If you are a current or former smoker age 50 to 80, ask your care team if ongoing lung cancer screenings are right for you.  For informational purposes only. Not to replace the advice of your health care provider. Copyright   2023 [employer]. All rights reserved. Clinically reviewed by the [facility]. kinkon [id number] - REV 04/24.    Learning About Stress  What is stress?     Stress is your body's response to a hard situation. Your body can have a physical, emotional, or mental response. Stress is a fact of life for most people, and it affects everyone differently. What causes stress for you may not be stressful for someone else.  A lot of things can cause stress. You may feel stress when you go on a job interview, take a test, or run a race. This kind of short-term stress is normal and even useful. It can help you if you need to work hard or react quickly. For example, stress can help you finish an important job on time.  Long-term stress is caused by ongoing stressful situations or events. Examples of long-term stress include long-term health problems, ongoing problems at work, or conflicts in your family. Long-term stress can harm your health.  How does stress affect your health?  When you are stressed, your body responds as though you are in danger. It makes hormones that speed up your heart, make you breathe faster, and give you a burst of energy. This is called the fight-or-flight stress response. If the stress is over quickly, your body goes back to normal and no harm is done.  But if stress happens too often or lasts too long, it can have bad effects. Long-term stress can make you more likely to get sick, and it can make symptoms of some diseases worse. If you tense up when you are stressed, you may develop neck, shoulder, or low back pain. Stress is  linked to high blood pressure and heart disease.  Stress also harms your emotional health. It can make you churchill, tense, or depressed. Your relationships may suffer, and you may not do well at work or school.  What can you do to manage stress?  You can try these things to help manage stress:   Do something active. Exercise or activity can help reduce stress. Walking is a great way to get started. Even everyday activities such as housecleaning or yard work can help.  Try yoga or clarke chi. These techniques combine exercise and meditation. You may need some training at first to learn them.  Do something you enjoy. For example, listen to music or go to a movie. Practice your hobby or do volunteer work.  Meditate. This can help you relax, because you are not worrying about what happened before or what may happen in the future.  Do guided imagery. Imagine yourself in any setting that helps you feel calm. You can use online videos, books, or a teacher to guide you.  Do breathing exercises. For example:  From a standing position, bend forward from the waist with your knees slightly bent. Let your arms dangle close to the floor.  Breathe in slowly and deeply as you return to a standing position. Roll up slowly and lift your head last.  Hold your breath for just a few seconds in the standing position.  Breathe out slowly and bend forward from the waist.  Let your feelings out. Talk, laugh, cry, and express anger when you need to. Talking with supportive friends or family, a counselor, or a guillermo leader about your feelings is a healthy way to relieve stress. Avoid discussing your feelings with people who make you feel worse.  Write. It may help to write about things that are bothering you. This helps you find out how much stress you feel and what is causing it. When you know this, you can find better ways to cope.  What can you do to prevent stress?  You might try some of these things to help prevent stress:  Manage your time.  "This helps you find time to do the things you want and need to do.  Get enough sleep. Your body recovers from the stresses of the day while you are sleeping.  Get support. Your family, friends, and community can make a difference in how you experience stress.  Limit your news feed. Avoid or limit time on social media or news that may make you feel stressed.  Do something active. Exercise or activity can help reduce stress. Walking is a great way to get started.  Where can you learn more?  Go to https://www.[employer].net/patiented  Enter N032 in the search box to learn more about \"Learning About Stress.\"  Current as of: October 24, 2023               Content Version: 14.0    1656-9620 [employer].   Care instructions adapted under license by your healthcare professional. If you have questions about a medical condition or this instruction, always ask your healthcare professional. [employer] disclaims any warranty or liability for your use of this information.      "

## 2024-05-10 NOTE — PROGRESS NOTES
Preventive Care Visit  [facility]  Alix Waters PA-C, Physician Assistant - Medical  May 10, 2024      Assessment & Plan     Routine general medical examination at a health care facility  - REVIEW OF HEALTH MAINTENANCE PROTOCOL ORDERS  - CBC with platelets  - Basic metabolic panel  - Lipid panel reflex to direct LDL Non-fasting  - CBC with platelets  - Basic metabolic panel  - Lipid panel reflex to direct LDL Non-fasting        Counseling  Appropriate preventive services were discussed with this patient, including applicable screening as appropriate for fall prevention, nutrition, physical activity, Tobacco-use cessation, weight loss and cognition.  Checklist reviewing preventive services available has been given to the patient.  Reviewed patient's diet, addressing concerns and/or questions.   She is at risk for psychosocial distress and has been provided with information to reduce risk.           Pollo Flanagan is a 39 year old, presenting for the following:  Physical  '  Masha here today for [facility] visit    Currently breastfeeding 1 year old (today) daughter        5/10/2024     1:16 PM   Additional Questions   Roomed by Palmira GARZA        Health Care Directive  Patient does not have a Health Care Directive or Living Will: Discussed advance care planning with patient; information given to patient to review.    HPI      5/7/2024   General Health   How would you rate your overall physical health? Good   Feel stress (tense, anxious, or unable to sleep) Only a little   (!) STRESS CONCERN      5/7/2024   Nutrition   Three or more servings of calcium each day? Yes   Diet: Gluten-free/reduced   How many servings of fruit and vegetables per day? (!) 2-3   How many sweetened beverages each day? 0-1         5/7/2024   Exercise   Days per week of moderate/strenous exercise 7 days   Average minutes spent exercising at this level 50 min         5/7/2024   Social Factors   Frequency of gathering with  friends or relatives Once a week   Worry food won't last until get money to buy more No   Food not last or not have enough money for food? No   Do you have housing?  Yes   Are you worried about losing your housing? No   Lack of transportation? No   Unable to get utilities (heat,electricity)? No         5/7/2024   Dental   Dentist two times every year? Yes         5/7/2024   TB Screening   Were you born outside of the [place]? No     Today's PHQ-2 Score:       5/10/2024     1:12 PM   PHQ-2 ( 1999 [employer])   Q1: Little interest or pleasure in doing things 0   Q2: Feeling down, depressed or hopeless 0   PHQ-2 Score 0   Q1: Little interest or pleasure in doing things Not at all   Q2: Feeling down, depressed or hopeless Not at all   PHQ-2 Score 0         5/7/2024   Substance Use   Alcohol more than 3/day or more than 7/wk No   Do you use any other substances recreationally? No     Social History     Tobacco Use    Smoking status: Never    Smokeless tobacco: Never   Vaping Use    Vaping status: Never Used   Substance Use Topics    Alcohol use: Yes     Comment: occasional weekend drinks, 1-2    Drug use: Never           6/10/2022   LAST FHS-7 RESULTS   1st degree relative breast or ovarian cancer Unknown   Any relative bilateral breast cancer No   Any male have breast cancer Yes   Any ONE woman have BOTH breast AND ovarian cancer Unknown   Any woman with breast cancer before 50yrs Unknown   2 or more relatives with breast AND/OR ovarian cancer Unknown   2 or more relatives with breast AND/OR bowel cancer No        Mammogram Screening - Mammogram every 1-2 years updated in Health Maintenance based on mutual decision making when she finishes breastfeeding        5/7/2024   STI Screening   New sexual partner(s) since last STI/HIV test? No     History of abnormal Pap smear: NO - age 30-65 PAP every 5 years with negative HPV co-testing recommended        Latest Ref Rng & Units 7/7/2021     3:45 PM 7/7/2021     3:31 PM   PAP / HPV  "  PAP (Historical)   NIL    HPV 16 DNA NEG^Negative Negative     HPV 18 DNA NEG^Negative Negative     Other HR HPV NEG^Negative Negative       ASCVD Risk   The 10-year ASCVD risk score (Kate STOREY, et al., 2019) is: 0.2%    Values used to calculate the score:      Age: 39 years      Sex: Female      Is Non- : No      Diabetic: No      Tobacco smoker: No      Systolic Blood Pressure: 110 mmHg      Is BP treated: No      HDL Cholesterol: 70 mg/dL      Total Cholesterol: 175 mg/dL        5/7/2024   Contraception/Family Planning   Questions about contraception or family planning No        Reviewed and updated as needed this visit by Provider     Meds  Problems  Med Hx  Surg Hx  Fam Hx               Objective    Exam  /64 (BP Location: Right arm, Patient Position: Sitting, Cuff Size: Adult Regular)   Pulse 97   Temp 98.3  F (36.8  C) (Temporal)   Resp 14   Ht 1.715 m (5' 7.5\")   Wt 72.8 kg (160 lb 9.6 oz)   LMP 05/09/2024 (Exact Date)   SpO2 100%   Breastfeeding Yes   BMI 24.78 kg/m     Estimated body mass index is 24.78 kg/m  as calculated from the following:    Height as of this encounter: 1.715 m (5' 7.5\").    Weight as of this encounter: 72.8 kg (160 lb 9.6 oz).    Physical Exam  GENERAL: alert and no distress  EYES: Eyes grossly normal to inspection, PERRL and conjunctivae and sclerae normal  HENT: ear canals and TM's normal, nose and mouth without ulcers or lesions  NECK: no adenopathy, no asymmetry, masses, or scars  RESP: lungs clear to auscultation - no rales, rhonchi or wheezes  BREAST: normal without masses, tenderness or nipple discharge and no palpable axillary masses or adenopathy  CV: regular rate and rhythm, normal S1 S2, no S3 or S4, no murmur, click or rub, no peripheral edema  ABDOMEN: soft, nontender, no hepatosplenomegaly, no masses and bowel sounds normal  MS: no gross musculoskeletal defects noted, no edema  SKIN: no suspicious lesions or " virgilio  NEURO: Normal strength and tone, mentation intact and speech normal  PSYCH: mentation appears normal, affect normal/bright        Signed Electronically by: Alix Waters PA-C

## 2024-05-11 LAB
ANION GAP SERPL CALCULATED.3IONS-SCNC: 11 MMOL/L (ref 7–15)
BUN SERPL-MCNC: 13.6 MG/DL (ref 6–20)
CALCIUM SERPL-MCNC: 9.2 MG/DL (ref 8.6–10)
CHLORIDE SERPL-SCNC: 101 MMOL/L (ref 98–107)
CHOLEST SERPL-MCNC: 180 MG/DL
CREAT SERPL-MCNC: 1 MG/DL (ref 0.51–0.95)
DEPRECATED HCO3 PLAS-SCNC: 29 MMOL/L (ref 22–29)
EGFRCR SERPLBLD CKD-EPI 2021: 73 ML/MIN/1.73M2
FASTING STATUS PATIENT QL REPORTED: NO
FASTING STATUS PATIENT QL REPORTED: NO
GLUCOSE SERPL-MCNC: 109 MG/DL (ref 70–99)
HDLC SERPL-MCNC: 73 MG/DL
LDLC SERPL CALC-MCNC: 86 MG/DL
NONHDLC SERPL-MCNC: 107 MG/DL
POTASSIUM SERPL-SCNC: 3.8 MMOL/L (ref 3.4–5.3)
SODIUM SERPL-SCNC: 141 MMOL/L (ref 135–145)
TRIGL SERPL-MCNC: 105 MG/DL

## 2025-04-10 ENCOUNTER — PATIENT OUTREACH (OUTPATIENT)
Dept: CARE COORDINATION | Facility: CLINIC | Age: 40
End: 2025-04-10
Payer: COMMERCIAL

## 2025-04-24 ENCOUNTER — PATIENT OUTREACH (OUTPATIENT)
Dept: CARE COORDINATION | Facility: CLINIC | Age: 40
End: 2025-04-24
Payer: COMMERCIAL

## 2025-07-16 SDOH — HEALTH STABILITY: PHYSICAL HEALTH: ON AVERAGE, HOW MANY MINUTES DO YOU ENGAGE IN EXERCISE AT THIS LEVEL?: 50 MIN

## 2025-07-16 SDOH — HEALTH STABILITY: PHYSICAL HEALTH: ON AVERAGE, HOW MANY DAYS PER WEEK DO YOU ENGAGE IN MODERATE TO STRENUOUS EXERCISE (LIKE A BRISK WALK)?: 5 DAYS

## 2025-07-16 ASSESSMENT — SOCIAL DETERMINANTS OF HEALTH (SDOH): HOW OFTEN DO YOU GET TOGETHER WITH FRIENDS OR RELATIVES?: ONCE A WEEK

## 2025-07-17 ENCOUNTER — OFFICE VISIT (OUTPATIENT)
Dept: FAMILY MEDICINE | Facility: CLINIC | Age: 40
End: 2025-07-17
Payer: COMMERCIAL

## 2025-07-17 VITALS
BODY MASS INDEX: 26.63 KG/M2 | HEIGHT: 68 IN | OXYGEN SATURATION: 100 % | TEMPERATURE: 97.5 F | HEART RATE: 100 BPM | WEIGHT: 175.7 LBS | RESPIRATION RATE: 18 BRPM | DIASTOLIC BLOOD PRESSURE: 75 MMHG | SYSTOLIC BLOOD PRESSURE: 113 MMHG

## 2025-07-17 DIAGNOSIS — Z00.00 ROUTINE GENERAL MEDICAL EXAMINATION AT A HEALTH CARE FACILITY: Primary | ICD-10-CM

## 2025-07-17 DIAGNOSIS — S93.401A SPRAIN OF RIGHT ANKLE, UNSPECIFIED LIGAMENT, INITIAL ENCOUNTER: ICD-10-CM

## 2025-07-17 LAB
ANION GAP SERPL CALCULATED.3IONS-SCNC: 8 MMOL/L (ref 7–15)
BUN SERPL-MCNC: 10 MG/DL (ref 6–20)
CALCIUM SERPL-MCNC: 9.2 MG/DL (ref 8.8–10.4)
CHLORIDE SERPL-SCNC: 102 MMOL/L (ref 98–107)
CHOLEST SERPL-MCNC: 193 MG/DL
CREAT SERPL-MCNC: 0.71 MG/DL (ref 0.51–0.95)
EGFRCR SERPLBLD CKD-EPI 2021: >90 ML/MIN/1.73M2
ERYTHROCYTE [DISTWIDTH] IN BLOOD BY AUTOMATED COUNT: 12.1 % (ref 10–15)
FASTING STATUS PATIENT QL REPORTED: NO
FASTING STATUS PATIENT QL REPORTED: NO
GLUCOSE SERPL-MCNC: 101 MG/DL (ref 70–99)
HCO3 SERPL-SCNC: 28 MMOL/L (ref 22–29)
HCT VFR BLD AUTO: 40.8 % (ref 35–47)
HDLC SERPL-MCNC: 71 MG/DL
HGB BLD-MCNC: 13.5 G/DL (ref 11.7–15.7)
LDLC SERPL CALC-MCNC: 106 MG/DL
MCH RBC QN AUTO: 28.1 PG (ref 26.5–33)
MCHC RBC AUTO-ENTMCNC: 33.1 G/DL (ref 31.5–36.5)
MCV RBC AUTO: 85 FL (ref 78–100)
NONHDLC SERPL-MCNC: 122 MG/DL
PLATELET # BLD AUTO: 213 10E3/UL (ref 150–450)
POTASSIUM SERPL-SCNC: 4.2 MMOL/L (ref 3.4–5.3)
RBC # BLD AUTO: 4.8 10E6/UL (ref 3.8–5.2)
SODIUM SERPL-SCNC: 138 MMOL/L (ref 135–145)
TRIGL SERPL-MCNC: 81 MG/DL
TSH SERPL DL<=0.005 MIU/L-ACNC: 1.56 UIU/ML (ref 0.3–4.2)
WBC # BLD AUTO: 6 10E3/UL (ref 4–11)

## 2025-07-17 ASSESSMENT — PAIN SCALES - GENERAL: PAINLEVEL_OUTOF10: NO PAIN (0)

## 2025-07-17 NOTE — PROGRESS NOTES
"Preventive Care Visit  [facility]  Alix Waters PA-C, Physician Assistant - Medical  Jul 17, 2025      Assessment & Plan     Routine general medical examination at a health care facility  - REVIEW OF HEALTH MAINTENANCE PROTOCOL ORDERS  - Adult Nutrition  Referral; Future  - CBC with platelets; Future  - Basic metabolic panel; Future  - Lipid panel reflex to direct LDL Non-fasting; Future  - TSH with free T4 reflex; Future  - CBC with platelets  - Basic metabolic panel  - Lipid panel reflex to direct LDL Non-fasting  - TSH with free T4 reflex    Sprain of right ankle, unspecified ligament, initial encounter - PT referral provided  - Physical Therapy  Referral; Future    Bilateral carpal tunnel - encouraged night splinting. If no improvement, can request referral to hand therapy and/or injection.      BMI  Estimated body mass index is 26.72 kg/m  as calculated from the following:    Height as of this encounter: 1.727 m (5' 8\").    Weight as of this encounter: 79.7 kg (175 lb 11.2 oz).     Counseling  Appropriate preventive services were addressed with this patient via screening, questionnaire, or discussion as appropriate for fall prevention, nutrition, physical activity, Tobacco-use cessation, social engagement, weight loss and cognition.  Checklist reviewing preventive services available has been given to the patient.  Reviewed patient's diet, addressing concerns and/or questions.     The longitudinal plan of care for the diagnosis(es)/condition(s) as documented were addressed during this visit. Due to the added complexity in care, I will continue to support Masha in the subsequent management and with ongoing continuity of care.      Subjective   Masha is a 40 year old, presenting for the following:  Physical        7/17/2025    10:31 AM   Additional Questions   Roomed by Collins Flanagan here today for [facility]    ? Carpal tunnel  Weakness in hands with opening jars, " pain & tingling in bilateral fingers (ring, index, thumb) elena at night  Wonders about PT or other options    Reports right ankle sprain last fall, improved with time  Hurt again this spring and still has on and off swelling, pain  Wonders about PT/management    HPI  Advance Care Planning    Patient states has Health Care Directive and will send to [facility].        7/16/2025   General Health   How would you rate your overall physical health? Good   Feel stress (tense, anxious, or unable to sleep) Only a little   (!) STRESS CONCERN      7/16/2025   Nutrition   Three or more servings of calcium each day? (!) I DON'T KNOW   Diet: Gluten-free/reduced   How many servings of fruit and vegetables per day? (!) 2-3   How many sweetened beverages each day? 0-1         7/16/2025   Exercise   Days per week of moderate/strenous exercise 5 days   Average minutes spent exercising at this level 50 min         7/16/2025   Social Factors   Frequency of gathering with friends or relatives Once a week   Worry food won't last until get money to buy more No   Food not last or not have enough money for food? No   Do you have housing? (Housing is defined as stable permanent housing and does not include staying outside in a car, in a tent, in an abandoned building, in an overnight shelter, or couch-surfing.) Yes   Are you worried about losing your housing? No   Lack of transportation? No   Unable to get utilities (heat,electricity)? No         7/16/2025   Dental   Dentist two times every year? Yes         Today's PHQ-2 Score:       7/16/2025     2:44 PM   PHQ-2 ( 1999 Pfizer)   Q1: Little interest or pleasure in doing things 0   Q2: Feeling down, depressed or hopeless 0   PHQ-2 Score 0    Q1: Little interest or pleasure in doing things Not at all   Q2: Feeling down, depressed or hopeless Not at all   PHQ-2 Score 0       Patient-reported           7/16/2025   Substance Use   Alcohol more than 3/day or more than 7/wk No   Do you use  any other substances recreationally? No     Social History     Tobacco Use    Smoking status: Never    Smokeless tobacco: Never   Vaping Use    Vaping status: Never Used   Substance Use Topics    Alcohol use: Yes     Comment: occasional weekend drinks, 1-2    Drug use: Never           6/10/2022   LAST FHS-7 RESULTS   1st degree relative breast or ovarian cancer Unknown    Any relative bilateral breast cancer No    Any male have breast cancer Yes    Any ONE woman have BOTH breast AND ovarian cancer Unknown    Any woman with breast cancer before 50yrs Unknown    2 or more relatives with breast AND/OR ovarian cancer Unknown    2 or more relatives with breast AND/OR bowel cancer No        Proxy-reported        Currently breastfeeding, defer mammograms.        7/16/2025   STI Screening   New sexual partner(s) since last STI/HIV test? No     History of abnormal Pap smear: No - age 30- 64 PAP with HPV every 5 years recommended        Latest Ref Rng & Units 7/7/2021     3:45 PM 7/7/2021     3:31 PM   PAP / HPV   PAP (Historical)   NIL    HPV 16 DNA NEG^Negative Negative     HPV 18 DNA NEG^Negative Negative     Other HR HPV NEG^Negative Negative       ASCVD Risk   The 10-year ASCVD risk score (Kate STOREY, et al., 2019) is: 0.2%    Values used to calculate the score:      Age: 40 years      Sex: Female      Is Non- : No      Diabetic: No      Tobacco smoker: No      Systolic Blood Pressure: 113 mmHg      Is BP treated: No      HDL Cholesterol: 73 mg/dL      Total Cholesterol: 180 mg/dL        7/16/2025   Contraception/Family Planning   Questions about contraception or family planning No   What are your periods like? Regular        Reviewed and updated as needed this visit by Provider       Med Hx  Surg Hx  Fam Hx               Objective    Exam  /75 (BP Location: Right arm, Patient Position: Sitting, Cuff Size: Adult Regular)   Pulse 100   Temp 97.5  F (36.4  C) (Temporal)   Resp 18  "  Ht 1.727 m (5' 8\")   Wt 79.7 kg (175 lb 11.2 oz)   LMP 07/05/2025 (Exact Date)   SpO2 100%   BMI 26.72 kg/m     Estimated body mass index is 26.72 kg/m  as calculated from the following:    Height as of this encounter: 1.727 m (5' 8\").    Weight as of this encounter: 79.7 kg (175 lb 11.2 oz).    Physical Exam  GENERAL: alert and no distress  EYES: Eyes grossly normal to inspection, PERRL and conjunctivae and sclerae normal  HENT: ear canals and TM's normal, nose and mouth without ulcers or lesions  NECK: no adenopathy, no asymmetry, masses, or scars  RESP: lungs clear to auscultation - no rales, rhonchi or wheezes  BREAST: normal without masses, tenderness or nipple discharge and no palpable axillary masses or adenopathy  CV: regular rate and rhythm, normal S1 S2, no S3 or S4, no murmur, click or rub, no peripheral edema  ABDOMEN: soft, nontender, no hepatosplenomegaly, no masses and bowel sounds normal  MS: no gross musculoskeletal defects noted, no edema  SKIN: no suspicious lesions or rashes  NEURO: Normal strength and tone, mentation intact and speech normal  PSYCH: mentation appears normal, affect normal/bright        Signed Electronically by: Alix Waters PA-C    "

## 2025-07-17 NOTE — PATIENT INSTRUCTIONS
Patient Education   Preventive Care Advice   This is general advice given by our system to help you stay healthy. However, your care team may have specific advice just for you. Please talk to your care team about your preventive care needs.  Nutrition  Eat 5 or more servings of fruits and vegetables each day.  Try wheat bread, brown rice and whole grain pasta (instead of white bread, rice, and pasta).  Get enough calcium and vitamin D. Check the label on foods and aim for 100% of the RDA (recommended daily allowance).  Lifestyle  Exercise at least 150 minutes each week  (30 minutes a day, 5 days a week).  Do muscle strengthening activities 2 days a week. These help control your weight and prevent disease.  No smoking.  Wear sunscreen to prevent skin cancer.  Have a dental exam and cleaning every 6 months.  Yearly exams  See your health care team every year to talk about:  Any changes in your health.  Any medicines your care team has prescribed.  Preventive care, family planning, and ways to prevent chronic diseases.  Shots (vaccines)   HPV shots (up to age 26), if you've never had them before.  Hepatitis B shots (up to age 59), if you've never had them before.  COVID-19 shot: Get this shot when it's due.  Flu shot: Get a flu shot every year.  Tetanus shot: Get a tetanus shot every 10 years.  Pneumococcal, hepatitis A, and RSV shots: Ask your care team if you need these based on your risk.  Shingles shot (for age 50 and up)  General health tests  Diabetes screening:  Starting at age 35, Get screened for diabetes at least every 3 years.  If you are younger than age 35, ask your care team if you should be screened for diabetes.  Cholesterol test: At age 39, start having a cholesterol test every 5 years, or more often if advised.  Bone density scan (DEXA): At age 50, ask your care team if you should have this scan for osteoporosis (brittle bones).  Hepatitis C: Get tested at least once in your life.  STIs (sexually  transmitted infections)  Before age 24: Ask your care team if you should be screened for STIs.  After age 24: Get screened for STIs if you're at risk. You are at risk for STIs (including HIV) if:  You are sexually active with more than one person.  You don't use condoms every time.  You or a partner was diagnosed with a sexually transmitted infection.  If you are at risk for HIV, ask about PrEP medicine to prevent HIV.  Get tested for HIV at least once in your life, whether you are at risk for HIV or not.  Cancer screening tests  Cervical cancer screening: If you have a cervix, begin getting regular cervical cancer screening tests starting at age 21.  Breast cancer scan (mammogram): If you've ever had breasts, begin having regular mammograms starting at age 40. This is a scan to check for breast cancer.  Colon cancer screening: It is important to start screening for colon cancer at age 45.  Have a colonoscopy test every 10 years (or more often if you're at risk) Or, ask your provider about stool tests like a FIT test every year or Cologuard test every 3 years.  To learn more about your testing options, visit:   .  For help making a decision, visit:   https://bit.ly/qu97244.  Prostate cancer screening test: If you have a prostate, ask your care team if a prostate cancer screening test (PSA) at age 55 is right for you.  Lung cancer screening: If you are a current or former smoker ages 50 to 80, ask your care team if ongoing lung cancer screenings are right for you.  For informational purposes only. Not to replace the advice of your health care provider. Copyright   2023 [employer]. All rights reserved. Clinically reviewed by the [facility] Transitions Program. [employer] [id number] - REV 01/24.

## 2025-07-24 ENCOUNTER — THERAPY VISIT (OUTPATIENT)
Dept: PHYSICAL THERAPY | Facility: CLINIC | Age: 40
End: 2025-07-24
Attending: PHYSICIAN ASSISTANT
Payer: COMMERCIAL

## 2025-07-24 DIAGNOSIS — S93.401A SPRAIN OF RIGHT ANKLE, UNSPECIFIED LIGAMENT, INITIAL ENCOUNTER: ICD-10-CM

## 2025-07-24 ASSESSMENT — ACTIVITIES OF DAILY LIVING (ADL)
GETTING_INTO_AND_OUT_OF_A_BATH: NO DIFFICULTY
SITTING_FOR_1_HOUR: NO DIFFICULTY
PLEASE_INDICATE_YOR_PRIMARY_REASON_FOR_REFERRAL_TO_THERAPY:: FOOT AND/OR ANKLE
GOING_UP_OR_DOWN_10_STAIRS: NO DIFFICULTY
RUNNING_ON_EVEN_GROUND: MODERATE DIFFICULTY
STANDING_FOR_1_HOUR: NO DIFFICULTY
SQUATTING: MODERATE DIFFICULTY
ANY_OF_YOUR_USUAL_WORK,_HOUSEWORK_OR_SCHOOL_ACTIVITIES: NO DIFFICULTY
LEFS_RAW_SCORE: 0
WALKING_2_BLOCKS: NO DIFFICULTY
LEFS_SCORE(%): 0
WALKING_A_MILE: NO DIFFICULTY
ROLLING_OVER_IN_BED: NO DIFFICULTY
WALKING_BETWEEN_ROOMS: NO DIFFICULTY
SHOPPING: MODERATE DIFFICULTY
LIFTING_AN_OBJECT,_LIKE_A_BAG_OF_GROCERIES_FROM_THE_FLOOR: MODERATE DIFFICULTY
PERFORMING_LIGHT_ACTIVITIES_AROUND_YOUR_HOME: NO DIFFICULTY
PUTTING_ON_YOUR_SHOES_OR_SOCKS: NO DIFFICULTY
PERFORMING_HEAVY_ACTIVITIES_AROUND_YOUR_HOME: A LITTLE BIT OF DIFFICULTY
MAKING_SHARP_TURNS_WHILE_RUNNING_FAST: QUITE A BIT OF DIFFICULTY
YOUR_USUAL_HOBBIES,_RECREATIONAL_OR_SPORTING_ACTIVITIES: A LITTLE BIT OF DIFFICULTY
GETTING_INTO_OR_OUT_OF_A_CAR: NO DIFFICULTY
RUNNING_ON_UNEVEN_GROUND: QUITE A BIT OF DIFFICULTY

## 2025-07-24 NOTE — PROGRESS NOTES
PHYSICAL THERAPY EVALUATION  Type of Visit: Evaluation       Fall Risk Screen:  Have you fallen 2 or more times in the past year?: No  Have you fallen and had an injury in the past year?: No    Subjective   Patient recalls twisting her right ankle initially last fall with some reoccurrences. Notes ankle inflammation that is starting to go down. Unable to run without ankle pain. Aggravation with lifting and carrying her daughter (25lbs), driving. Notes increased R ankle popping.         Presenting condition or subjective complaint: My ankle has been twisted on and off all school year.  Date of onset: 07/17/25 (date of order)    Relevant medical history:     Past Medical History:   Diagnosis Date    Anemia     Gluten-sensitive enteropathy     Granuloma annulare     Rosacea     Varicella        Dates & types of surgery:    Past Surgical History:   Procedure Laterality Date    BIOPSY  2009    lipoma next to neck    CYST REMOVAL      fatty tissue cyst, benign, on neck, left side    ENT SURGERY  1988?    adenoids removed and ear tubes    fibrous papule of nose      removed by derm         Prior diagnostic imaging/testing results:       Prior therapy history for the same diagnosis, illness or injury: No      Living Environment  Social support: With family members   Type of home: House   Stairs to enter the home: Yes 5 Is there a railing: Yes     Ramp: No   Stairs inside the home: Yes 3 Is there a railing: Yes     Help at home: None  Equipment owned:       Employment: Yes Teacher  Hobbies/Interests: Walking, reading    Patient goals for therapy: Run, return to yoga without re-injury      Pain assessment: Pain present  Location: R lateral ankle twinges/Rating: intermittent twinges     Objective   FOOT/ANKLE EVALUATION  PAIN: Pain Level at Rest: 0/10  Pain Level with Use: 2/10  Pain Quality: Dull and Sharp  Pain is Exacerbated By: running, carrying daughter, driving   Pain is Relieved By: rest and stretch  Pain Progression:  Improved  INTEGUMENTARY (edema, incisions): WNL, contusion on R lateral foot  BALANCE/PROPRIOCEPTION: Single Leg Stance Eyes Open (seconds): >20 sec, increased ankle strategy on R LE  ROM:   (Degrees) Left AROM Left PROM  Right AROM Right PROM   Ankle Dorsiflexion 20  5    Ankle Plantarflexion 80  76    Ankle Inversion       Ankle Eversion 35  20, increased lateral ankle tension    Great Toe Flexion WNL  WNL    Great Toe Extension WNL  WNL    Pain:   End feel:     STRENGTH:   Pain: - none + mild ++ moderate +++ severe  Strength Scale: 0-5/5 Left Right   Ankle Dorsiflexion 5 4+, hesitation   Ankle Plantarflexion 4 4   Ankle Inversion 5 5   Ankle Eversion 4+ 4   Great Toe Flexion     Great Toe Extension 5 5       FUNCTIONAL TESTS: reports feeling instability with R heel lift  PALPATION: tenderness to R peroneals,   JOINT MOBILITY: WNL    Assessment & Plan   CLINICAL IMPRESSIONS  Medical Diagnosis: Sprain of right ankle, unspecified ligament, initial encounter    Treatment Diagnosis: Right ankle weakness and pain   Impression/Assessment: Patient is a 40 year old female with ankle pain complaints.  The following significant findings have been identified: Pain, Decreased ROM/flexibility, Decreased strength, Impaired balance, Impaired muscle performance, and Decreased activity tolerance. These impairments interfere with their ability to perform recreational activities, household chores, and driving  as compared to previous level of function.     Clinical Decision Making (Complexity):  Clinical Presentation: Stable/Uncomplicated  Clinical Presentation Rationale: based on medical and personal factors listed in PT evaluation  Clinical Decision Making (Complexity): Low complexity    PLAN OF CARE  Treatment Interventions:  Modalities: Cryotherapy, Dry Needling, Hot Pack  Interventions: Gait Training, Manual Therapy, Neuromuscular Re-education, Therapeutic Activity, Therapeutic Exercise, Self-Care/Home Management    Long Term  Goals     PT Goal 1  Goal Description: Patient will be able to run at least 2 miles with <1/10 R ankle pain  Rationale: to maximize safety and independence with performance of ADLs and functional tasks  Target Date: 10/01/25  PT Goal 2  Goal Description: Patient will participate in 60 minute yoga class without sensations of instability in right ankle  Rationale: to maximize safety and independence with performance of ADLs and functional tasks  Target Date: 10/01/25  PT Goal 3  Goal Description: Patient will drive at least 25 minutes without R ankle pain in order to drive to work  Rationale: to maximize safety and independence with performance of ADLs and functional tasks;to maximize safety and independence with transportation  Target Date: 10/01/25      Frequency of Treatment: 1x per week  Duration of Treatment: 10 weeks    Recommended Referrals to Other Professionals:   Education Assessment:   Learner/Method: Patient    Risks and benefits of evaluation/treatment have been explained.   Patient/Family/caregiver agrees with Plan of Care.     Evaluation Time:     PT Eval, Low Complexity Minutes (39948): 20       Signing Clinician: Ro Salcedo, PT

## 2025-08-12 ENCOUNTER — ORDERS ONLY (AUTO-RELEASED) (OUTPATIENT)
Dept: FAMILY MEDICINE | Facility: CLINIC | Age: 40
End: 2025-08-12
Payer: COMMERCIAL

## 2025-08-13 ENCOUNTER — THERAPY VISIT (OUTPATIENT)
Dept: PHYSICAL THERAPY | Facility: CLINIC | Age: 40
End: 2025-08-13
Attending: PHYSICIAN ASSISTANT
Payer: COMMERCIAL

## 2025-08-13 ENCOUNTER — LAB (OUTPATIENT)
Dept: LAB | Facility: CLINIC | Age: 40
End: 2025-08-13
Payer: COMMERCIAL

## 2025-08-13 DIAGNOSIS — Z83.719 FAMILY HISTORY OF COLONIC POLYPS: ICD-10-CM

## 2025-08-13 DIAGNOSIS — S93.401A SPRAIN OF RIGHT ANKLE, UNSPECIFIED LIGAMENT, INITIAL ENCOUNTER: Primary | ICD-10-CM

## 2025-08-13 PROCEDURE — 97110 THERAPEUTIC EXERCISES: CPT | Mod: GP

## 2025-09-01 LAB — HEMOCCULT STL QL IA: NEGATIVE
